# Patient Record
Sex: FEMALE | Race: WHITE
[De-identification: names, ages, dates, MRNs, and addresses within clinical notes are randomized per-mention and may not be internally consistent; named-entity substitution may affect disease eponyms.]

---

## 2017-06-19 ENCOUNTER — HOSPITAL ENCOUNTER (EMERGENCY)
Dept: HOSPITAL 11 - JP.ED | Age: 67
Discharge: HOME | End: 2017-06-19
Payer: MEDICARE

## 2017-06-19 VITALS — DIASTOLIC BLOOD PRESSURE: 78 MMHG | SYSTOLIC BLOOD PRESSURE: 139 MMHG

## 2017-06-19 DIAGNOSIS — J44.9: ICD-10-CM

## 2017-06-19 DIAGNOSIS — N18.3: ICD-10-CM

## 2017-06-19 DIAGNOSIS — Z98.890: ICD-10-CM

## 2017-06-19 DIAGNOSIS — Z98.49: ICD-10-CM

## 2017-06-19 DIAGNOSIS — E78.00: ICD-10-CM

## 2017-06-19 DIAGNOSIS — S73.102A: Primary | ICD-10-CM

## 2017-06-19 DIAGNOSIS — E66.9: ICD-10-CM

## 2017-06-19 DIAGNOSIS — Z79.899: ICD-10-CM

## 2017-06-19 DIAGNOSIS — Z79.82: ICD-10-CM

## 2017-06-19 DIAGNOSIS — Z88.5: ICD-10-CM
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
## 2017-06-19 DIAGNOSIS — Z88.8: ICD-10-CM

## 2017-06-19 DIAGNOSIS — E11.9: ICD-10-CM

## 2017-06-19 DIAGNOSIS — I12.9: ICD-10-CM

## 2017-06-19 DIAGNOSIS — M19.90: ICD-10-CM

## 2017-06-19 PROCEDURE — 73502 X-RAY EXAM HIP UNI 2-3 VIEWS: CPT

## 2017-06-19 PROCEDURE — 99284 EMERGENCY DEPT VISIT MOD MDM: CPT

## 2017-06-19 PROCEDURE — 73700 CT LOWER EXTREMITY W/O DYE: CPT

## 2017-06-19 NOTE — EDM.PDOC
ED HPI GENERAL MEDICAL PROBLEM





- General


Chief Complaint: Lower Extremity Injury/Pain


Stated Complaint: HIP PAIN


Time Seen by Provider: 17 17:50


Source of Information: Reports: Patient


History Limitations: Reports: No Limitations





- History of Present Illness


INITIAL COMMENTS - FREE TEXT/NARRATIVE: 





Patient presents today with complaints of left hip pain status post fall 

forward PTA today.  Sabrina reports she fell forward after slipping on some 

water on the floor.  She denies any other injury or pain today. 


Onset: Today


Duration: Hour(s):


Location: Reports: Other (left hip/groin)


Quality: Reports: Sharp, Stabbing


Severity: Moderate


Improves with: Reports: None


Worsens with: Reports: Movement





- Related Data


 Allergies











Allergy/AdvReac Type Severity Reaction Status Date / Time


 


codeine Allergy Intermediate Rash Verified 17 17:29


 


iodine Allergy Intermediate Rash Verified 17 17:29


 


Iodinated Contrast- Oral and Allergy  Hives Verified 17 17:29





IV Dye     





[Iodinated Contrast Media -     





IV Dye]     


 


isosorbide [Isosorbide] AdvReac  Cough Verified 17 17:29


 


midazolam HCl [From Versed] AdvReac  Confusion Verified 17 17:29


 


NSAIDS (Non-Steroidal AdvReac  Renal Verified 17 17:29





Anti-Inflamma   Failure  











Home Meds: 


 Home Meds





Albuterol Sulfate [Proair Hfa] 2 puff IH QID PRN 10/26/13 [History]


Aspirin [Adult Low Dose Aspirin EC] 81 mg PO DAILY 10/26/13 [History]


Cholecalciferol (Vitamin D3) [Vitamin D3] 1,000 units PO BID 10/26/13 [History]


Gabapentin [Neurontin] 300 mg PO TID 10/26/13 [History]


Loratadine [Allergy] 10 mg PO ACBRK PRN 10/26/13 [History]


Montelukast [Singulair] 10 mg PO BEDTIME 10/26/13 [History]


Polyethylene Glycol 3350 [MiraLAX] 17 gm PO DAILY PRN 10/26/13 [History]


metFORMIN [Glucophage] 1,000 mg PO PCBREAKFAST 10/26/13 [History]


traZODone 50 mg PO BEDTIME 10/26/13 [History]


Citalopram Hydrobromide [Celexa] 40 mg PO BEDTIME 04/08/14 [History]


Verapamil [Calan] 20 mg PO TID 04/10/14 [History]


Ipratropium/Albuterol Sulfate [Combivent Respimat Inhal Spray] 1 puff IH QID 02/

18/15 [History]


Oxybutynin Chloride 5 mg PO TID 02/18/15 [History]


Gabapentin [Neurontin] 600 mg PO BEDTIME 08/05/15 [History]


Hydrocodone/Acetaminophen [Hydrocodon-Acetaminophen 5-325] 1 tab PO Q6H PRN 08/

05/15 [History]


Pantoprazole [Protonix] 40 mg PO BID 10/23/15 [History]


buPROPion HCl [Wellbutrin Xl] 150 mg PO BID 16 [History]


Acetaminophen [Tylenol Arthritis] 650 mg PO Q8H PRN 11/10/16 [History]


atorvaSTATin [Lipitor] 10 mg PO BEDTIME 11/10/16 [History]











Past Medical History


HEENT History: Reports: Cataract, Hard of Hearing, Impaired Vision, Macular 

Degeneration


Other HEENT History: wears glasses


Cardiovascular History: Reports: Arrhythmia, High Cholesterol, Hypertension, 

SOB on Exertion


Respiratory History: Reports: Asthma, COPD, SOB


Gastrointestinal History: Reports: GERD, Hiatal Hernia


Other Gastrointestinal History: Barretts esoph


Genitourinary History: Reports: Renal Disease, Urinary Incontinence


Other Genitourinary History: stage 3 kidney


OB/GYN History: Reports: Pregnancy


Musculoskeletal History: Reports: Arthritis, Fracture, Neck Pain, Chronic, 

Osteoarthritis


Neurological History: Reports: Headaches, Chronic


Other Neuro History: blood clot in brain 1 year ago resolved on it's own


Psychiatric History: Reports: Anxiety, Mood Swings


Endocrine/Metabolic History: Reports: Diabetes, Type II, Obesity/BMI 30+, 

Vitamin D Deficiency


Other Endocrine/Metabolic History: BS this am 135


Hematologic History: Reports: Anemia, Blood Transfusion(s)


Dermatologic History: Reports: Eczema





- Infectious Disease History


Infectious Disease History: Reports: Chicken Pox





- Past Surgical History


Head Surgeries/Procedures: Reports: None


HEENT Surgical History: Reports: Cataract Surgery, Eye Surgery


Respiratory Surgical History: Reports: None


GI Surgical History: Reports: Colonoscopy, EGD, Esophageal Dilatation, Hernia 

Repair/Other, Nissen Fundoplication


Female  Surgical History: Reports:  Section


Musculoskeletal Surgical History: Reports: Shoulder Surgery





Social & Family History





- Family History


Family Medical History: Noncontributory





- Tobacco Use


Smoking Status *Q: Never Smoker


Second Hand Smoke Exposure: Yes





- Caffeine Use


Caffeine Use: Reports: Soda, Tea





- Alcohol Use


Days Per Week of Alcohol Use: 0





- Recreational Drug Use


Recreational Drug Use: No





Review of Systems





- Review of Systems


Review Of Systems: See Below


Constitutional: Reports: No Symptoms


Eyes: Reports: No Symptoms


Ears: Reports: No Symptoms


Nose: Reports: No Symptoms


Respiratory: Reports: No Symptoms


Cardiovascular: Reports: No Symptoms


GI/Abdominal: Reports: No Symptoms


Genitourinary: Reports: No Symptoms


Musculoskeletal: Reports: Joint Pain, Other (left hip pain)


Skin: Denies: Cyanosis, Bruising, Rash, Erythema, Wound


Neurological: Denies: Confusion, Dizziness, Headache, Syncope, Weakness


Psychiatric: Reports: No Symptoms





ED EXAM, GENERAL





- Physical Exam


Exam: See Below


Exam Limited By: No Limitations


General Appearance: Alert, WD/WN, No Apparent Distress


Eye Exam: Bilateral Eye: Normal Inspection, PERRL


Ears: Normal External Exam, Normal Canal, Hearing Grossly Normal, Normal TMs


Ear Exam: Bilateral Ear: Auricle Normal, Canal Normal, TM normal


Nose: Normal Inspection, Normal Mucosa, No Blood


Throat/Mouth: Normal Inspection, Normal Lips, Normal Teeth, Normal Gums, Normal 

Voice, No Airway Compromise


Head: Atraumatic, Normocephalic


Neck: Normal Inspection, Supple, Non-Tender, Full Range of Motion


Respiratory/Chest: No Respiratory Distress, No Accessory Muscle Use, Chest Non-

Tender, Decreased Breath Sounds.  No: Respiratory Distress, Wheezing


Cardiovascular: Normal Peripheral Pulses, Regular Rate, Rhythm, No Edema, No 

Gallop, No Murmur, No Rub


Peripheral Pulses: 2+: Radial (L), Radial (R), Dorsalis Pedis (L), Dorsalis 

Pedis (R)


GI/Abdominal: Normal Bowel Sounds, Soft, Non-Tender, No Organomegaly, No 

Distention, No Abnormal Bruit, No Mass


Back Exam: Normal Inspection, Full Range of Motion.  No: CVA Tenderness (R), 

CVA Tenderness (L)


Extremities: No Pedal Edema, Normal Capillary Refill, Other (left hip pain with 

any movement or rotation. No shortening of left leg. )


Neurological: Alert, Oriented, CN II-XII Intact, Normal Cognition, Normal Gait, 

No Motor/Sensory Deficits


Psychiatric: Normal Affect, Normal Mood


Skin Exam: Warm, Dry, Intact, Normal Color, No Rash


Lymphatic: No Adenopathy





Course





- Vital Signs


Last Recorded V/S: 


 Last Vital Signs











Temp  37.1 C   17 17:38


 


Pulse  78   17 17:38


 


Resp  20   17 17:38


 


BP  139/78   17 17:38


 


Pulse Ox  94 L  17 17:38














- Orders/Labs/Meds


Orders: 


 Active Orders 24 hr











 Category Date Time Status


 


 Hip Min 2V or 3V w Pelvis Lt [CR] Stat Exams  17 18:15 Taken


 


 Hip wo Cont Lt [CT] Stat Exams  17 18:55 Taken











Meds: 


Medications














Discontinued Medications














Generic Name Dose Route Start Last Admin





  Trade Name Deonte  PRN Reason Stop Dose Admin


 


Hydrocodone Bitart/Acetaminophen  0.5 tab  17 18:09  17 18:46





  Norco 325-5 Mg  PO  17 18:10  0.5 tab





  ONETIME ONE   Administration














- Radiology Interpretation


Free Text/Narrative:: 





No acute findings noted to left hip/pelvis x-ray.  Pain continues to be 

disproportionate to left hip.  Attempts to assist patient with bedpan, severe 

pain with movement.  





Patient and her family members in agreement with plan. 


CT Results Date: 17


CT Results Time: 19:48 (No acute pelvic or left hip fracture. )





- Re-Assessments/Exams


Free Text/Narrative Re-Assessment/Exam: 





17 19:48


Patient and family members notified of CT results.  Dr. Pacheco notified of 

results. 


Patient reports pain has lessened after use of hydrocodone. 


We will try to ambulate her with assistance. 


Free Text/Narrative Re-Assessment/Exam: 





17 19:58


Patient up to ambulate with use of front wheel walker, tolerated well with 

slight pain.  Able to ambulate >100ft.





Patient will be discharged to home. 





Departure





- Departure


Time of Disposition: 19:59


Disposition: Home, Self-Care 01


Condition: Fair


Clinical Impression: 


 Sprain of hip, Contusion of hip








- Discharge Information


Instructions:  Hip Pain


Referrals: 


Reece Pichardo MD [Primary Care Provider] - 


Forms:  ED Department Discharge


Additional Instructions: 


You have suffered a sprain/contusion of your left hip.





Keep yourself hydrated, rest, use ice/heat as you tolerated to the left hip 

area.  Use hydrocodone 5/325mg, 1/2 pill every 4 to 6 hours as needed for pain.

  





Follow up with your primary care provider in 7 to 14 days for recheck and pain 

control.





You would benefit from use of a front wheel walker.





I have provided a prescription for you.











- Problem List Review


Problem List Initiated/Reviewed/Updated: Yes





- My Orders


Last 24 Hours: 


My Active Orders





17 18:15


Hip Min 2V or 3V w Pelvis Lt [CR] Stat 





17 18:55


Hip wo Cont Lt [CT] Stat 














- Assessment/Plan


Last 24 Hours: 


My Active Orders





17 18:15


Hip Min 2V or 3V w Pelvis Lt [CR] Stat 





17 18:55


Hip wo Cont Lt [CT] Stat 











Assessment:: 





Left hip sprain, contusion


Unsteady gait


Plan: 


Patient will be discharged to home. 


She is advised to stay hydrated, rest, use ice/heat as tolerated to the left 

hip area.  Use hydrocodone 5/325mg, 1/2 pill every 4 to 6 hours as needed for 

pain.  





Follow up with primary care provider in 7 to 14 days for recheck and pain 

control.





She would benefit from use of a front wheel walker (S73.101A, S70.02XA, R 26.81)

.


Prescription provided.

## 2017-06-20 NOTE — CR
Hip Min 2V or 3V w Pelvis Lt

 

HISTORY: left hip pain status post fall forward

 

COMPARISON: 5/19/2016

 

FINDINGS:

No acute fracture or dislocation about the left hip or pelvis is identified.  Bony architecture and 
joint spaces are preserved. No hypertrophic changes are seen. Soft tissues are unremarkable.

 

IMPRESSION:

No acute left hip or pelvic abnormality is identified.

## 2018-07-02 ENCOUNTER — HOSPITAL ENCOUNTER (OUTPATIENT)
Dept: HOSPITAL 11 - JP.SDS | Age: 68
Discharge: HOME | End: 2018-07-02
Attending: SURGERY
Payer: MEDICARE

## 2018-07-02 VITALS — SYSTOLIC BLOOD PRESSURE: 132 MMHG | DIASTOLIC BLOOD PRESSURE: 81 MMHG

## 2018-07-02 DIAGNOSIS — I12.9: ICD-10-CM

## 2018-07-02 DIAGNOSIS — K29.70: ICD-10-CM

## 2018-07-02 DIAGNOSIS — Z87.19: ICD-10-CM

## 2018-07-02 DIAGNOSIS — K44.9: ICD-10-CM

## 2018-07-02 DIAGNOSIS — Z88.8: ICD-10-CM

## 2018-07-02 DIAGNOSIS — K20.9: ICD-10-CM

## 2018-07-02 DIAGNOSIS — J44.9: ICD-10-CM

## 2018-07-02 DIAGNOSIS — N18.9: ICD-10-CM

## 2018-07-02 DIAGNOSIS — Z09: Primary | ICD-10-CM

## 2018-07-02 PROCEDURE — 43239 EGD BIOPSY SINGLE/MULTIPLE: CPT

## 2018-07-02 PROCEDURE — 88305 TISSUE EXAM BY PATHOLOGIST: CPT

## 2018-07-02 PROCEDURE — 87081 CULTURE SCREEN ONLY: CPT

## 2018-07-11 NOTE — OR
DATE OF PROCEDURE:  07/02/2018

 

PREOPERATIVE DIAGNOSIS:  History of Blanco's esophagus with high-grade dysplasia, status

post radiofrequency ablation treatment.

 

POSTOPERATIVE DIAGNOSES:

1. History of Blanco's esophagus with high-grade dysplasia, status post radiofrequency

    ablation treatment.

2. Antral gastritis.

 

OPERATIVE PROCEDURES:  Upper GI endoscopy with:

1. Biopsies of esophagogastric junction for histologic evaluation.

2. Biopsies of antrum for CLOtest.

 

ANESTHESIA:  IV sedation.

 

INDICATIONS FOR PROCEDURE:  This is a 67-year-old status post a series of radiofrequency

ablation treatments for Blanco's esophagus with high-grade dysplasia.  For followup, she is

to undergo surveillance endoscopies to rule out any recurrent or persistent Blanco's

esophagus.  Potential risks of the procedure including bleeding and perforation were

discussed, and the patient wishes that to proceed.

 

DETAILS OF PROCEDURE:  The patient was taken to the operating room and placed in a left

lateral decubitus position.  IV sedation was administered, after which the upper GI

endoscope was passed orally through the length of the esophagus and into the stomach with

retroflexion view of the fundus, thereafter through the pyloric channel and into junction of

the third and fourth portions of the duodenum.

 

Findings included normal hypopharynx, larynx, upper esophageal junction, and esophageal

body.  At the EG junction, the patient was noted to have a small hiatal hernia.  There was

some upward extension of the gastroesophageal junction mucosal line consistent with her

history of Blanco's esophagus.  There was no significant inflammation and no erosions or

stricturing or plaque formation, i.e. no signs of progressive neoplastic disease.  Within

the stomach, there was some mild patchy redness in the antrum.  Otherwise, the pyloric

channel and visualized portions of the duodenum were unremarkable.  At this point, biopsies

were obtained from the antrum and sent for CLOtest for H. pylori.  Multiple biopsies were

obtained from the area of the esophagogastric junction, focusing on the areas of columnar

mucosa.  Minimal bleeding from the biopsy sites was seen, and the procedure was then

concluded.

 

Assuming that today's biopsies did not show any progression of Blanco's esophagus, we would

recommend a repeat upper endoscopy in one year.  We will continue the present medical

management with Protonix 40 mg a day.

 

 

 

 

Quang Soto MD

DD:  07/10/2018 19:24:06

DT:  07/11/2018 10:35:05

Job #:  [id number]

## 2019-07-15 ENCOUNTER — HOSPITAL ENCOUNTER (OUTPATIENT)
Dept: HOSPITAL 11 - JP.ED | Age: 69
Setting detail: OBSERVATION
LOS: 1 days | Discharge: HOME HEALTH | End: 2019-07-16
Attending: INTERNAL MEDICINE | Admitting: INTERNAL MEDICINE
Payer: MEDICARE

## 2019-07-15 DIAGNOSIS — E11.9: ICD-10-CM

## 2019-07-15 DIAGNOSIS — Z79.84: ICD-10-CM

## 2019-07-15 DIAGNOSIS — Z79.899: ICD-10-CM

## 2019-07-15 DIAGNOSIS — J15.9: Primary | ICD-10-CM

## 2019-07-15 DIAGNOSIS — R29.6: ICD-10-CM

## 2019-07-15 DIAGNOSIS — Z88.8: ICD-10-CM

## 2019-07-15 DIAGNOSIS — J44.9: ICD-10-CM

## 2019-07-15 DIAGNOSIS — Z91.041: ICD-10-CM

## 2019-07-15 DIAGNOSIS — Z88.6: ICD-10-CM

## 2019-07-15 DIAGNOSIS — E78.00: ICD-10-CM

## 2019-07-15 DIAGNOSIS — I10: ICD-10-CM

## 2019-07-15 DIAGNOSIS — Z79.82: ICD-10-CM

## 2019-07-15 DIAGNOSIS — Z88.5: ICD-10-CM

## 2019-07-15 PROCEDURE — 96361 HYDRATE IV INFUSION ADD-ON: CPT

## 2019-07-15 PROCEDURE — 99218: CPT

## 2019-07-15 PROCEDURE — 99217: CPT

## 2019-07-15 PROCEDURE — 96376 TX/PRO/DX INJ SAME DRUG ADON: CPT

## 2019-07-15 PROCEDURE — 99285 EMERGENCY DEPT VISIT HI MDM: CPT

## 2019-07-15 PROCEDURE — 51702 INSERT TEMP BLADDER CATH: CPT

## 2019-07-15 PROCEDURE — 82962 GLUCOSE BLOOD TEST: CPT

## 2019-07-15 PROCEDURE — 85027 COMPLETE CBC AUTOMATED: CPT

## 2019-07-15 PROCEDURE — 80048 BASIC METABOLIC PNL TOTAL CA: CPT

## 2019-07-15 PROCEDURE — 86140 C-REACTIVE PROTEIN: CPT

## 2019-07-15 PROCEDURE — 71046 X-RAY EXAM CHEST 2 VIEWS: CPT

## 2019-07-15 PROCEDURE — 96365 THER/PROPH/DIAG IV INF INIT: CPT

## 2019-07-15 PROCEDURE — 99284 EMERGENCY DEPT VISIT MOD MDM: CPT

## 2019-07-15 PROCEDURE — G0378 HOSPITAL OBSERVATION PER HR: HCPCS

## 2019-07-15 PROCEDURE — 97530 THERAPEUTIC ACTIVITIES: CPT

## 2019-07-15 PROCEDURE — 97110 THERAPEUTIC EXERCISES: CPT

## 2019-07-15 PROCEDURE — 97161 PT EVAL LOW COMPLEX 20 MIN: CPT

## 2019-07-15 PROCEDURE — 36415 COLL VENOUS BLD VENIPUNCTURE: CPT

## 2019-07-15 PROCEDURE — 81001 URINALYSIS AUTO W/SCOPE: CPT

## 2019-07-15 RX ADMIN — Medication SCH MG: at 20:26

## 2019-07-15 RX ADMIN — GABAPENTIN SCH MG: 300 CAPSULE ORAL at 20:22

## 2019-07-15 RX ADMIN — Medication SCH MG: at 20:24

## 2019-07-15 RX ADMIN — PANTOPRAZOLE SODIUM SCH MG: 40 TABLET, DELAYED RELEASE ORAL at 20:26

## 2019-07-15 RX ADMIN — BUPROPION HYDROCHLORIDE SCH MG: 150 TABLET, EXTENDED RELEASE ORAL at 20:25

## 2019-07-15 NOTE — PCM.HP
H&P History of Present Illness





- General


Date of Service: 07/15/19


Admit Problem/Dx: 


 Admission Diagnosis/Problem





Admission Diagnosis/Problem      Pneumonia








Source of Information: Patient, Family, Provider


History Limitations: Reports: No Limitations





- History of Present Illness


Initial Comments - Free Text/Narative: 





CC: I have never fallen like that before 





HPI: Sabrina presented to the ER with weakness after two falls at home this 

morning. The first fall happened this morning will she was trying to get out of 

bed. She reports that she slowly slumped down to the floor after she was not 

able to reach her walker. After a period of time on the floor she was able to 

get to a standing position after using the walker to steady herself as she 

stood up. She tried to get from her bedroom to her recliner but had a second 

fall on the way. Both times she struck her head with the first fall resulting 

in a very mild bump on the left side of her head and the second fall resulting 

in a home mild to moderate bump on the side of her head. She does not report a 

headache. She did not notice any preceding dizziness or lightheadedness. She 

does feel little more short of breath than usual and has been coughing but has 

not produced any sputum. She did have a subjective fever at home this morning. 

Her appetite has been good. Energy had been good up until today. She does have 

a history of falling but has not fallen in some time. No complaints of dysuria 

or frequency of urination. No abdominal pain or nausea. No sick contacts, 

recent travel or new medications.





Workup in the emergency room was suggestive of a right middle lobe pneumonia. 

She has a leukocytosis and C-reactive protein is moderately elevated at 2.3. 

Creatinine is down at 1.3. She is borderline hypoxic at this time. With her 

weakness she would benefit from observation admission and physical therapy as 

well as initiation of antibiotics.





- Related Data


Allergies/Adverse Reactions: 


 Allergies











Allergy/AdvReac Type Severity Reaction Status Date / Time


 


codeine Allergy Intermediate Rash Verified 12/10/18 19:25


 


iodine Allergy Intermediate Rash Verified 12/10/18 19:25


 


Iodinated Contrast- Oral and Allergy  Hives Verified 12/10/18 19:25





IV Dye     





[Iodinated Contrast Media -     





IV Dye]     


 


isosorbide [Isosorbide] AdvReac  Cough Verified 12/10/18 19:25


 


midazolam HCl [From Versed] AdvReac  Confusion Verified 12/10/18 19:25


 


NSAIDS (Non-Steroidal AdvReac  Renal Verified 12/10/18 19:25





Anti-Inflamma   Failure  











Home Medications: 


 Home Meds





Albuterol Sulfate [Proair Hfa] 2 puff IH QID PRN 10/26/13 [History]


Aspirin [Adult Low Dose Aspirin EC] 81 mg PO DAILY 10/26/13 [History]


Cholecalciferol (Vitamin D3) [Vitamin D3] 1,000 units PO BID 10/26/13 [History]


Gabapentin [Neurontin] 300 mg PO TID 10/26/13 [History]


Loratadine [Allergy] 10 mg PO ACBRK PRN 10/26/13 [History]


Montelukast [Singulair] 10 mg PO BEDTIME 10/26/13 [History]


Polyethylene Glycol 3350 [MiraLAX] 17 gm PO DAILY PRN 10/26/13 [History]


metFORMIN [Glucophage] 1,000 mg PO PCBREAKFAST 10/26/13 [History]


traZODone 50 mg PO BEDTIME 10/26/13 [History]


Verapamil [Calan] 20 mg PO TID 04/10/14 [History]


Ipratropium/Albuterol Sulfate [Combivent Respimat  Mcg] 1 puff IH DAILY 02

/18/15 [History]


Oxybutynin Chloride 5 mg PO TID 02/18/15 [History]


Gabapentin [Neurontin] 600 mg PO BEDTIME 08/05/15 [History]


Pantoprazole [Protonix] 40 mg PO BID 10/23/15 [History]


Acetaminophen [Tylenol Arthritis] 650 mg PO Q8H PRN 11/10/16 [History]


Triamcinolone Acetonide [Triamcinolone Acetonide 0.1% Crm] 1 applic TOP TID  [History]


Acetaminophen/HYDROcodone [Norco 325-5 MG] 0.5 tab PO Q4H PRN 07/15/19 [History]


Citalopram Hydrobromide [Celexa] 40 mg PO DAILY 07/15/19 [History]


atorvaSTATin [Lipitor] 10 mg PO BEDTIME 07/15/19 [History]


buPROPion HCl [Wellbutrin SR] 150 mg PO BID 07/15/19 [History]











Past Medical History


HEENT History: Reports: Cataract, Hard of Hearing, Impaired Vision, Macular 

Degeneration


Other HEENT History: wears glasses


Cardiovascular History: Reports: Arrhythmia, High Cholesterol, Hypertension, 

SOB on Exertion


Respiratory History: Reports: Asthma, COPD, SOB


Gastrointestinal History: Reports: GERD, Hiatal Hernia


Other Gastrointestinal History: Barretts esoph


Genitourinary History: Reports: Renal Disease, Urinary Incontinence


Other Genitourinary History: stage 3 kidney


OB/GYN History: Reports: Pregnancy


Musculoskeletal History: Reports: Arthritis, Fracture, Neck Pain, Chronic, 

Osteoarthritis


Neurological History: Reports: Headaches, Chronic


Other Neuro History: blood clot in brain (2016) resolved on it's own


Psychiatric History: Reports: Anxiety, Mood Swings


Endocrine/Metabolic History: Reports: Diabetes, Type II, Obesity/BMI 30+, 

Vitamin D Deficiency


Other Endocrine/Metabolic History: BS this am 100


Hematologic History: Reports: Anemia, Blood Transfusion(s)


Dermatologic History: Reports: Eczema





- Infectious Disease History


Infectious Disease History: Reports: Chicken Pox





- Past Surgical History


Head Surgeries/Procedures: Reports: None


HEENT Surgical History: Reports: Cataract Surgery, Eye Surgery


Cardiovascular Surgical History: Reports: None


Respiratory Surgical History: Reports: None


GI Surgical History: Reports: Colonoscopy, EGD, Esophageal Dilatation, Hernia 

Repair/Other, Nissen Fundoplication


Female  Surgical History: Reports:  Section


Endocrine Surgical History: Reports: None


Neurological Surgical History: Reports: None


Musculoskeletal Surgical History: Reports: Shoulder Surgery


Dermatological Surgical History: Reports: None





Social & Family History





- Family History


Family Medical History: Noncontributory





- Tobacco Use


Smoking Status *Q: Never Smoker





- Caffeine Use


Caffeine Use: Reports: None





- Alcohol Use


Alcohol Use History: No





- Recreational Drug Use


Recreational Drug Use: No





H&P Review of Systems





- Review of Systems:


Review Of Systems: See Below


Free Text/Narrative: 





A complete 12 point review of systems was obtained.  Pertinent positives and 

negatives are noted in the history of present illness.  All other systems were 

reviewed and were negative except as noted.





Exam





- Exam


Exam: See Below





- Vital Signs


Vital Signs: 


 Last Vital Signs











Temp  38.0 C   07/15/19 10:02


 


Pulse  68   07/15/19 12:49


 


Resp  20   07/15/19 09:25


 


BP  116/51 L  07/15/19 12:49


 


Pulse Ox  92 L  07/15/19 12:49











Weight: 67.585 kg





- Exam


Quality Assessment: No: Supplemental Oxygen


General: Alert, Oriented, Cooperative.  No: Mild Distress


HEENT: Conjunctiva Clear, Mucosa Moist & Pink.  No: Scleral Icterus


Neck: Supple, Trachea Midline.  No: Lymphadenopathy


Lungs: Normal Respiratory Effort, Crackles (right mid lung posteriorly).  No: 

Wheezing


Cardiovascular: Regular Rate, Regular Rhythm


GI/Abdominal Exam: Normal Bowel Sounds, Soft, No Distention


Extremities: No Pedal Edema.  No: Increased Warmth


Peripheral Pulses: 2+: Dorsalis Pedis (L), Dorsalis Pedis (R)


Skin: Warm, Dry, Ecchymosis (right medial lower leg )


Neuro Extensive - Mental Status: Alert, Oriented x3, Nl Response to Commands


Neuro Extensive - Motor, Sensory, Reflexes: No: Dysarthria, Abnormal Motor, 

Tremor


Psychiatric: Alert, Normal Affect





- Patient Data


Lab Results Last 24 hrs: 


 Laboratory Results - last 24 hr











  07/15/19 07/15/19 07/15/19 Range/Units





  09:55 09:55 10:28 


 


WBC  17.4 H    (4.5-11.0)  K/uL


 


RBC  4.29    (3.30-5.50)  M/uL


 


Hgb  11.8 L    (12.0-15.0)  g/dL


 


Hct  37.6    (36.0-48.0)  %


 


MCV  88    (80-98)  fL


 


MCH  28    (27-31)  pg


 


MCHC  31 L    (32-36)  %


 


Plt Count  390    (150-400)  K/uL


 


Sodium   132 L   (140-148)  mmol/L


 


Potassium   5.2   (3.6-5.2)  mmol/L


 


Chloride   96 L   (100-108)  mmol/L


 


Carbon Dioxide   26   (21-32)  mmol/L


 


Anion Gap   15.2 H   (5.0-14.0)  mmol/L


 


BUN   16   (7-18)  mg/dL


 


Creatinine   1.3 H   (0.6-1.0)  mg/dL


 


Est Cr Clr Drug Dosing   29.75   mL/min


 


Estimated GFR (MDRD)   41 L   (>60)  


 


Glucose   174 H   ()  mg/dL


 


Calcium   8.7   (8.5-10.1)  mg/dL


 


C-Reactive Protein   1.97 H   (0.0-0.3)  mg/dL


 


Urine Color    Yellow  


 


Urine Appearance    Slightly cloudy  


 


Urine pH    6.0  (4.5-8.0)  


 


Ur Specific Gravity    1.015  (1.008-1.030)  


 


Urine Protein    Negative  (NEGATIVE)  mg/dL


 


Urine Glucose (UA)    250 H  (NEGATIVE)  mg/dL


 


Urine Ketones    Negative  (NEGATIVE)  mg/dL


 


Urine Occult Blood    Moderate  (NEGATIVE)  


 


Urine Nitrite    Negative  (NEGATIVE)  


 


Urine Bilirubin    Negative  (NEGATIVE)  


 


Urine Urobilinogen    Normal  (NORMAL)  mg/dL


 


Ur Leukocyte Esterase    Negative  (NEGATIVE)  


 


Urine RBC    0-5  (0-5)  


 


Urine WBC    0-5  (0-5)  


 


Ur Epithelial Cells    Few  


 


Amorphous Sediment    Not seen  


 


Urine Bacteria    Not seen  


 


Urine Mucus    Not seen  











Result Diagrams: 


 07/15/19 09:55





 07/15/19 09:55


Imaging Impressions Last 24 hrs: 





CXR - images personally reviewed - subtle density right middle lobe. Heart size 

is normal. No mass or effusion. 





*Q Meaningful Use (ADM)





- VTE Risk Assess *Q


Each Risk Factor Represents 1 Point: Obesity ( BMI > 25 kg/m2), Serious lung 

disease including pneumonia


Total Score 1 Point Risk Factors: 2


Each Risk Factor Represents 2 Points: Age 60 - 74 Years


Total Score 2 Point Risk Factors: 2


Each Risk Factor Represents 3 Points: None


Total Score 3 Point Risk Factors: 0


Each Risk Factor Represents 5 Points: None


Total Score 5 Point Risk Factors: 0


Venous Thromboembolism Risk Factor Score *Q: 4





- Problem List


(1) Community acquired bacterial pneumonia


SNOMED Code(s): 276509048, 781813393


   ICD Code: J15.9 - UNSPECIFIED BACTERIAL PNEUMONIA   Status: Acute   Current 

Visit: Yes   





(2) Fall in elderly patient


SNOMED Code(s): 757129322


   ICD Code: R29.6 - REPEATED FALLS   Status: Acute   Current Visit: Yes   


Problem List Initiated/Reviewed/Updated: Yes


Orders Last 24hrs: 


 Active Orders 24 hr











 Category Date Time Status


 


 Patient Status Manage Transfer [TRANSFER] Routine ADT  07/15/19 14:03 Ordered


 


 Sodium Chloride 0.9% [Normal Saline] 1,000 ml Med  07/15/19 12:00 Active





 IV ASDIRECTED   


 


 Resuscitation Status Routine Resus Stat  07/15/19 14:05 Ordered








 Medication Orders





Sodium Chloride (Normal Saline)  1,000 mls @ 100 mls/hr IV ASDIRECTED MEGHAN


   Last Admin: 07/15/19 11:58  Dose: 100 mls/hr








Assessment/Plan Comment:: 





ASSESSMENT AND PLAN - 





Right middle lobe pneumonia - symptoms include cough, shortness of breath and 

weakness. She does have leukocytosis but no evidence for sepsis. Oxygenation is 

borderline at this time. With her weakness and 2 falls at home she would 

benefit from observation. She does have a history of COPD but no evidence for 

exacerbation.


-Ceftriaxone and azithromycin


-Supplement oxygen if needed


-Symptomatic management of cough


-Sputum culture if able





Generalized weakness with 2 falls today - No evidence for trauma on 

examination. No headache or symptoms to suggest injury. Probably related to her 

infection.


-Physical therapy in the morning





Essential hypertension - blood pressure acceptable at this time.


-Continue home medications





Maintenance issues - 


- DVT prophylaxis - mechanical


- GI prophylaxis - continue home PPI


- Nutrition - regular diet


- Espinoza catheter - not indicated





CODE STATUS - full code





Admission justification - patient will be referred observation status for 

antibiotic initiation and physical therapy for strengthening





Disposition - I would anticipate discharged home with home care after the 

hospital stay





Primary care physician - Dr. Marino Rodríguez M.D.

## 2019-07-15 NOTE — CR
Chest 2V: 7/15/2019 10:53 AM

 

INDICATION: Fever, elevated white blood cell count

 

COMPARISON: Chest CT performed on 8/18/2015, chest radiograph performed on

2/18/2015.

 

TECHNIQUE: 2 views of the chest were obtained.

 

FINDINGS: Chronic interstitial changes are present. Ill-defined opacity is

present in the peripheral aspect of the right midlung, suspicious for infection.

No pleural effusions. Cardiomediastinal silhouette is moderately enlarged but

stable in size and contour. No acute osseous abnormality. Changes of DISH and

moderate multilevel degenerative changes throughout the spine.

 

IMPRESSION: 

1.  Ill-defined opacity in the right midlung region, likely representing

pneumonia. Recommend follow-up to resolution to exclude an underlying neoplastic

process.

2.  Cardiomegaly and chronic interstitial changes.

## 2019-07-15 NOTE — EDM.PDOC
ED HPI GENERAL MEDICAL PROBLEM





- General


Chief Complaint: General


Stated Complaint: MEDICAL VIA NORTH


Time Seen by Provider: 07/15/19 09:35


Source of Information: Reports: Patient, EMS, Family, Old Records


History Limitations: Reports: No Limitations





- History of Present Illness


INITIAL COMMENTS - FREE TEXT/NARRATIVE: 





69 yo female presents to the ER via EMS after a fall at home. There were no 

significant injuries. States she was light-headed before falling. Has been 

falling a lot lately and her primary got a walker to help reduce her risk of 

falling. Lives alone. No recent diarrhea, vomiting, or bleeding. Has chronic 

lung dz, but does not think her breathing is any worse than normal. Does have 

more urinary incontinence than usual lately. Was not using her walker when she 

fell today. 


Onset: Gradual


Duration: Week(s):, Getting Worse


Location: Reports: Generalized


Quality: Reports: Other (no pain)


Severity: Moderate


Improves with: Reports: Other (using her walker helps with her falls)


Worsens with: Reports: Other (non-compliance with walker use, medical illness)


Context: Reports: Other (see HPI)


Associated Symptoms: Reports: Shortness of Breath (chronic, stable), Weakness (

generalized).  Denies: Cough, Diaphoresis, Fever/Chills (none reported by 

patient), Nausea/Vomiting, Rash


Treatments PTA: Reports: Other (see below) (none)





- Related Data


 Allergies











Allergy/AdvReac Type Severity Reaction Status Date / Time


 


codeine Allergy Intermediate Rash Verified 12/10/18 19:25


 


iodine Allergy Intermediate Rash Verified 12/10/18 19:25


 


Iodinated Contrast- Oral and Allergy  Hives Verified 12/10/18 19:25





IV Dye     





[Iodinated Contrast Media -     





IV Dye]     


 


isosorbide [Isosorbide] AdvReac  Cough Verified 12/10/18 19:25


 


midazolam HCl [From Versed] AdvReac  Confusion Verified 12/10/18 19:25


 


NSAIDS (Non-Steroidal AdvReac  Renal Verified 12/10/18 19:25





Anti-Inflamma   Failure  











Home Meds: 


 Home Meds





Albuterol Sulfate [Proair Hfa] 2 puff IH QID PRN 10/26/13 [History]


Aspirin [Adult Low Dose Aspirin EC] 81 mg PO DAILY 10/26/13 [History]


Cholecalciferol (Vitamin D3) [Vitamin D3] 1,000 units PO BID 10/26/13 [History]


Gabapentin [Neurontin] 300 mg PO TID 10/26/13 [History]


Loratadine [Allergy] 10 mg PO ACBRK PRN 10/26/13 [History]


Montelukast [Singulair] 10 mg PO BEDTIME 10/26/13 [History]


Polyethylene Glycol 3350 [MiraLAX] 17 gm PO DAILY PRN 10/26/13 [History]


metFORMIN [Glucophage] 1,000 mg PO PCBREAKFAST 10/26/13 [History]


traZODone 50 mg PO BEDTIME 10/26/13 [History]


Verapamil [Calan] 20 mg PO TID 04/10/14 [History]


Ipratropium/Albuterol Sulfate [Combivent Respimat  Mcg] 1 puff IH DAILY 02

/18/15 [History]


Oxybutynin Chloride 5 mg PO TID 02/18/15 [History]


Gabapentin [Neurontin] 600 mg PO BEDTIME 08/05/15 [History]


Pantoprazole [Protonix] 40 mg PO BID 10/23/15 [History]


Acetaminophen [Tylenol Arthritis] 650 mg PO Q8H PRN 11/10/16 [History]


Triamcinolone Acetonide [Triamcinolone Acetonide 0.1% Crm] 1 applic TOP TID  [History]


Acetaminophen/HYDROcodone [Norco 325-5 MG] 0.5 tab PO Q4H PRN 07/15/19 [History]


Citalopram Hydrobromide [Celexa] 40 mg PO DAILY 07/15/19 [History]


atorvaSTATin [Lipitor] 10 mg PO BEDTIME 07/15/19 [History]


buPROPion HCl [Wellbutrin SR] 150 mg PO BID 07/15/19 [History]











Past Medical History


HEENT History: Reports: Cataract, Hard of Hearing, Impaired Vision, Macular 

Degeneration


Other HEENT History: wears glasses


Cardiovascular History: Reports: Arrhythmia, High Cholesterol, Hypertension, 

SOB on Exertion


Respiratory History: Reports: Asthma, COPD, SOB


Gastrointestinal History: Reports: GERD, Hiatal Hernia


Other Gastrointestinal History: Barretts esoph


Genitourinary History: Reports: Renal Disease, Urinary Incontinence


Other Genitourinary History: stage 3 kidney


OB/GYN History: Reports: Pregnancy


Musculoskeletal History: Reports: Arthritis, Fracture, Neck Pain, Chronic, 

Osteoarthritis


Neurological History: Reports: Headaches, Chronic


Other Neuro History: blood clot in brain (2016) resolved on it's own


Psychiatric History: Reports: Anxiety, Mood Swings


Endocrine/Metabolic History: Reports: Diabetes, Type II, Obesity/BMI 30+, 

Vitamin D Deficiency


Other Endocrine/Metabolic History: BS this am 100


Hematologic History: Reports: Anemia, Blood Transfusion(s)


Dermatologic History: Reports: Eczema





- Infectious Disease History


Infectious Disease History: Reports: Chicken Pox





- Past Surgical History


Head Surgeries/Procedures: Reports: None


HEENT Surgical History: Reports: Cataract Surgery, Eye Surgery


Cardiovascular Surgical History: Reports: None


Respiratory Surgical History: Reports: None


GI Surgical History: Reports: Colonoscopy, EGD, Esophageal Dilatation, Hernia 

Repair/Other, Nissen Fundoplication


Female  Surgical History: Reports:  Section


Endocrine Surgical History: Reports: None


Neurological Surgical History: Reports: None


Musculoskeletal Surgical History: Reports: Shoulder Surgery


Dermatological Surgical History: Reports: None





Social & Family History





- Family History


Family Medical History: Noncontributory





- Tobacco Use


Smoking Status *Q: Never Smoker





- Caffeine Use


Caffeine Use: Reports: None





- Recreational Drug Use


Recreational Drug Use: No





ED ROS GENERAL





- Review of Systems


Review Of Systems: See Below


Constitutional: Reports: Weakness


HEENT: Reports: Other (dry mouth)


Respiratory: Reports: Shortness of Breath (chronic, stable)


Cardiovascular: Reports: Lightheadedness (worse today)


Endocrine: Reports: No Symptoms


GI/Abdominal: Reports: No Symptoms


: Reports: Incontinence


Musculoskeletal: Reports: No Symptoms


Skin: Reports: No Symptoms


Neurological: Reports: No Symptoms


Psychiatric: Reports: No Symptoms





ED EXAM, GENERAL





- Physical Exam


Exam: See Below


Exam Limited By: No Limitations


General Appearance: Alert, WD/WN, No Apparent Distress, Other (Looks older than 

her stated age.)


Eye Exam: Bilateral Eye: Normal Inspection


Ears: Normal External Exam, Normal Canal, Hearing Grossly Normal, Normal TMs


Ear Exam: Bilateral Ear: Auricle Normal, Canal Normal, TM normal


Nose: Normal Inspection, No Blood


Throat/Mouth: Normal Lips, Normal Oropharynx, Normal Voice, No Airway Compromise

, Other (dry oral mucosa, edentulous).  No: Normal Teeth


Head: Atraumatic, Normocephalic


Neck: Normal Inspection, Non-Tender


Respiratory/Chest: No Respiratory Distress, Lungs Clear, No Accessory Muscle Use

, Decreased Breath Sounds


Cardiovascular: Regular Rate, Rhythm, No Edema


GI/Abdominal: Normal Bowel Sounds, Soft, Non-Tender, No Distention


Back Exam: Normal Inspection.  No: CVA Tenderness (R), CVA Tenderness (L)


Extremities: Normal Inspection, Normal Range of Motion, Non-Tender, No Pedal 

Edema


Neurological: Alert, Oriented, CN II-XII Intact, Normal Cognition, No Motor/

Sensory Deficits


Psychiatric: Normal Affect, Normal Mood


Skin Exam: Warm, Dry, Intact, Normal Color, No Rash





Course





- Vital Signs


Text/Narrative:: 





Dr. Rodríguez called @ 1232h


Last Recorded V/S: 


 Last Vital Signs











Temp  38.0 C   07/15/19 10:02


 


Pulse  98   07/15/19 09:25


 


Resp  20   07/15/19 09:25


 


BP  137/60   07/15/19 09:25


 


Pulse Ox  89 L  07/15/19 09:25














- Orders/Labs/Meds


Orders: 


 Active Orders 24 hr











 Category Date Time Status


 


 Azithromycin [Zithromax] Med  07/15/19 12:33 Once





 500 mg PO ONETIME ONE   


 


 Sodium Chloride 0.9% [Normal Saline] 1,000 ml Med  07/15/19 12:00 Active





 IV ASDIRECTED   


 


 cefTRIAXone [Rocephin] 1 gm Med  07/15/19 12:33 Ordered





 Sodium Chloride 0.9% [Normal Saline] 50 ml   





 IV ONETIME   








 Medication Orders





Sodium Chloride (Normal Saline)  1,000 mls @ 100 mls/hr IV ASDIRECTED MEGHAN


   Last Admin: 07/15/19 11:58  Dose: 100 mls/hr








Labs: 


 Laboratory Tests











  07/15/19 07/15/19 07/15/19 Range/Units





  09:55 09:55 10:28 


 


WBC  17.4 H    (4.5-11.0)  K/uL


 


RBC  4.29    (3.30-5.50)  M/uL


 


Hgb  11.8 L    (12.0-15.0)  g/dL


 


Hct  37.6    (36.0-48.0)  %


 


MCV  88    (80-98)  fL


 


MCH  28    (27-31)  pg


 


MCHC  31 L    (32-36)  %


 


Plt Count  390    (150-400)  K/uL


 


Sodium   132 L   (140-148)  mmol/L


 


Potassium   5.2   (3.6-5.2)  mmol/L


 


Chloride   96 L   (100-108)  mmol/L


 


Carbon Dioxide   26   (21-32)  mmol/L


 


Anion Gap   15.2 H   (5.0-14.0)  mmol/L


 


BUN   16   (7-18)  mg/dL


 


Creatinine   1.3 H   (0.6-1.0)  mg/dL


 


Est Cr Clr Drug Dosing   29.75   mL/min


 


Estimated GFR (MDRD)   41 L   (>60)  


 


Glucose   174 H   ()  mg/dL


 


Calcium   8.7   (8.5-10.1)  mg/dL


 


C-Reactive Protein   1.97 H   (0.0-0.3)  mg/dL


 


Urine Color    Yellow  


 


Urine Appearance    Slightly cloudy  


 


Urine pH    6.0  (4.5-8.0)  


 


Ur Specific Gravity    1.015  (1.008-1.030)  


 


Urine Protein    Negative  (NEGATIVE)  mg/dL


 


Urine Glucose (UA)    250 H  (NEGATIVE)  mg/dL


 


Urine Ketones    Negative  (NEGATIVE)  mg/dL


 


Urine Occult Blood    Moderate  (NEGATIVE)  


 


Urine Nitrite    Negative  (NEGATIVE)  


 


Urine Bilirubin    Negative  (NEGATIVE)  


 


Urine Urobilinogen    Normal  (NORMAL)  mg/dL


 


Ur Leukocyte Esterase    Negative  (NEGATIVE)  


 


Urine RBC    0-5  (0-5)  


 


Urine WBC    0-5  (0-5)  


 


Ur Epithelial Cells    Few  


 


Amorphous Sediment    Not seen  


 


Urine Bacteria    Not seen  


 


Urine Mucus    Not seen  











Meds: 


Medications











Generic Name Dose Route Start Last Admin





  Trade Name Freq  PRN Reason Stop Dose Admin


 


Sodium Chloride  1,000 mls @ 100 mls/hr  07/15/19 12:00  07/15/19 11:58





  Normal Saline  IV   100 mls/hr





  ASDIRECTED MEGHAN   Administration





     





     





     





     














Discontinued Medications














Generic Name Dose Route Start Last Admin





  Trade Name Freq  PRN Reason Stop Dose Admin


 


Acetaminophen  650 mg  07/15/19 09:45  07/15/19 10:02





  Tylenol  PO  07/15/19 09:46  650 mg





  NOW ONE   Administration





     





     





     





     


 


Sodium Chloride  1,000 mls @ 1,000 mls/hr  07/15/19 10:17  07/15/19 10:33





  Normal Saline  IV  07/15/19 11:16  1,000 mls/hr





  .BOLUS ONE   Administration





     





     





     





     














- Radiology Interpretation


Free Text/Narrative:: 





CXR-? RML infiltrate





Departure





- Departure


Time of Disposition: 12:50


Disposition: Refer to Observation


Condition: Fair


Clinical Impression: 


 Community acquired bacterial pneumonia, Hyponatremia, Fall in elderly patient, 

Dizziness








- Discharge Information


*PRESCRIPTION DRUG MONITORING PROGRAM REVIEWED*: No


*COPY OF PRESCRIPTION DRUG MONITORING REPORT IN PATIENT PILLO: No


Referrals: 


Reece Pichardo MD [Primary Care Provider] - 


Forms:  ED Department Discharge





- My Orders


Last 24 Hours: 


My Active Orders





07/15/19 12:00


Sodium Chloride 0.9% [Normal Saline] 1,000 ml IV ASDIRECTED 





07/15/19 12:33


Azithromycin [Zithromax]   500 mg PO ONETIME ONE 


cefTRIAXone [Rocephin] 1 gm   Sodium Chloride 0.9% [Normal Saline] 50 ml IV 

ONETIME 














- Assessment/Plan


Last 24 Hours: 


My Active Orders





07/15/19 12:00


Sodium Chloride 0.9% [Normal Saline] 1,000 ml IV ASDIRECTED 





07/15/19 12:33


Azithromycin [Zithromax]   500 mg PO ONETIME ONE 


cefTRIAXone [Rocephin] 1 gm   Sodium Chloride 0.9% [Normal Saline] 50 ml IV 

ONETIME

## 2019-07-16 VITALS — DIASTOLIC BLOOD PRESSURE: 46 MMHG | HEART RATE: 72 BPM | SYSTOLIC BLOOD PRESSURE: 133 MMHG

## 2019-07-16 RX ADMIN — PANTOPRAZOLE SODIUM SCH MG: 40 TABLET, DELAYED RELEASE ORAL at 08:08

## 2019-07-16 RX ADMIN — Medication SCH MG: at 08:08

## 2019-07-16 RX ADMIN — Medication SCH MG: at 08:07

## 2019-07-16 RX ADMIN — Medication SCH MG: at 13:21

## 2019-07-16 RX ADMIN — GABAPENTIN SCH MG: 300 CAPSULE ORAL at 08:05

## 2019-07-16 RX ADMIN — Medication SCH MG: at 13:20

## 2019-07-16 RX ADMIN — BUPROPION HYDROCHLORIDE SCH MG: 150 TABLET, EXTENDED RELEASE ORAL at 08:09

## 2019-07-16 RX ADMIN — GABAPENTIN SCH MG: 300 CAPSULE ORAL at 13:19

## 2019-07-16 NOTE — PCM.DCSUM1
**Discharge Summary





- Hospital Course


Brief History: 68-year-old female with history of COPD, well-controlled 

diabetes mellitus and chronic urge incontinence who presented with weakness and 

cough. She was admitted for management of right lower lobe pneumonia and 

weakness.


Diagnosis: Stroke: No





- Discharge Data


Discharge Date: 07/16/19


Discharge Disposition: Home, [facility]


Condition: Good





- Discharge Diagnosis/Problem(s)


(1) Community acquired bacterial pneumonia


SNOMED Code(s): 276834604, 221340406


   ICD Code: J15.9 - UNSPECIFIED BACTERIAL PNEUMONIA   Status: Acute   Current 

Visit: Yes   





(2) Fall in elderly patient


SNOMED Code(s): 053614750


   ICD Code: R29.6 - REPEATED FALLS   Status: Acute   Current Visit: Yes   





- Patient Summary/Data


Consults: 


 Consultations





07/16/19 07:00


PT Evaluation and Treatment [CONS] Routine 


   Please Evaluate and Treat.


   PT Reason for Consult: Strengthening


   This query below is only for informational purposes and is not editable.











Hospital Course: 





Sabrina presented to the emergency room with weakness, cough and mild shortness 

of breath. She had 2 falls at home the morning prior to presentation. Workup in 

the emergency room revealed leukocytosis and right middle lobe pneumonia. 

Fortunately she was not hypoxic and did not have evidence for sepsis. Because 

of her weakness and falls she was admitted for observation and initiation of 

antibiotics. Overnight following admission there were no acute issues. She did 

not have any fevers. She did not require supplemental oxygen. Cough is now 

productive. She is not significantly short of breath. She has been up and 

moving around with her walker and feels well. She did well with physical 

therapy this morning. Appetite has been good. She feels well enough to go home 

at this time. I believe she is stable and safe for outpatient management at 

this point as well. She will need 3 additional days of antibiotic therapy to 

complete a total of 5 days. She will continue her previous home care orders.





- Patient Instructions


Diet: Diabetic Diet


Activity: As Tolerated


Showering/Bathing: May Shower


Notify Provider of: Fever, Increased Pain, Nausea and/or Vomiting


Other/Special Instructions: 1. You were in the hospital for management of right 

middle lobe pneumonia. Your condition has been improving with antibiotic 

therapy. I recommend additional antibiotic therapy with cefdinir (Omnicef) and 

azithromycin. You should take cefdinir 300 mg twice daily with food for 3 more 

days. Your next dose is due tomorrow. You should take azithromycin 500 mg once 

daily in the mor for 3 more days. Your next dose is due tomorrow morning. Your 

next dose is due tomorrow morning.  2. Follow up  With Dr. Pichardo next week - 

follow-up hospital stay for pneumonia and discuss treatment of urinary retention

/incontinence.  3. Continue your usual home medications as previously 

prescribed.  4. Continue your previous home care orders after hospital 

discharge.  5. Seek medical attention if you have fever greater than 101, 

severe shortness of breath or if you develop chest pain/pressure





- Discharge Plan


*PRESCRIPTION DRUG MONITORING PROGRAM REVIEWED*: No


*COPY OF PRESCRIPTION DRUG MONITORING REPORT IN PATIENT PILLO: No


Prescriptions/Med Rec: 


Azithromycin 500 mg PO DAILY #3 tablet


Cefdinir 300 mg PO BID #6 capsule


Home Medications: 


 Home Meds





Albuterol Sulfate [Proair Hfa] 2 puff IH QID PRN 10/26/13 [History]


Aspirin [Adult Low Dose Aspirin EC] 81 mg PO DAILY 10/26/13 [History]


Cholecalciferol (Vitamin D3) [Vitamin D3] 1,000 units PO BID 10/26/13 [History]


Gabapentin [Neurontin] 300 mg PO TID 10/26/13 [History]


Loratadine [Allergy] 10 mg PO ACBRK PRN 10/26/13 [History]


Montelukast [Singulair] 10 mg PO BEDTIME 10/26/13 [History]


Polyethylene Glycol 3350 [MiraLAX] 17 gm PO DAILY PRN 10/26/13 [History]


metFORMIN [Glucophage] 1,000 mg PO PCBREAKFAST 10/26/13 [History]


traZODone 50 mg PO BEDTIME 10/26/13 [History]


Verapamil [Calan] 20 mg PO TID 04/10/14 [History]


Ipratropium/Albuterol Sulfate [Combivent Respimat  Mcg] 1 puff IH DAILY 02

/18/15 [History]


Oxybutynin Chloride 5 mg PO TID 02/18/15 [History]


Gabapentin [Neurontin] 600 mg PO BEDTIME 08/05/15 [History]


Pantoprazole [ProTONIX***] 40 mg PO BID 10/23/15 [History]


Acetaminophen [Tylenol Arthritis] 650 mg PO Q8H PRN 11/10/16 [History]


Triamcinolone Acetonide [Triamcinolone Acetonide 0.1% Crm] 1 applic TOP TID 06/ 28/18 [History]


Acetaminophen/HYDROcodone [Norco 325-5 MG] 0.5 tab PO Q4H PRN 07/15/19 [History]


Citalopram Hydrobromide [Celexa] 40 mg PO DAILY 07/15/19 [History]


atorvaSTATin [Lipitor] 10 mg PO BEDTIME 07/15/19 [History]


buPROPion HCl [Wellbutrin SR] 150 mg PO BID 07/15/19 [History]


Azithromycin 500 mg PO DAILY #3 tablet 07/16/19 [Rx]


Cefdinir 300 mg PO BID #6 capsule 07/16/19 [Rx]








Oxygen Therapy Mode: Room Air


Patient Handouts:  Cefdinir capsules, Community-Acquired Pneumonia, Adult, Easy-

to-Read


Referrals: 


Reece Pichardo MD [Primary Care Provider] - 07/24/19 1:30 pm (Please arrive 15 

minutes early to register for your appointment.)





- Discharge Summary/Plan Comment


DC Time >30 min.: No





- Patient Data


Vitals - Most Recent: 


 Last Vital Signs











Temp  36.9 C   07/16/19 11:12


 


Pulse  74   07/16/19 11:12


 


Resp  16   07/16/19 11:12


 


BP  149/64 H  07/16/19 11:12


 


Pulse Ox  93 L  07/16/19 11:12











Weight - Most Recent: 70.76 kg


I&O - Last 24 hours: 


 Intake & Output











 07/15/19 07/16/19 07/16/19





 22:59 06:59 14:59


 


Intake Total 100 1575 120


 


Output Total 550 2250 


 


Balance -450 -675 120











Lab Results - Last 24 hrs: 


 Laboratory Results - last 24 hr











  07/16/19 07/16/19 Range/Units





  05:38 05:38 


 


WBC  10.1   (4.5-11.0)  K/uL


 


RBC  3.68   (3.30-5.50)  M/uL


 


Hgb  10.2 L   (12.0-15.0)  g/dL


 


Hct  32.6 L   (36.0-48.0)  %


 


MCV  89   (80-98)  fL


 


MCH  28   (27-31)  pg


 


MCHC  31 L   (32-36)  %


 


Plt Count  361   (150-400)  K/uL


 


Sodium   141  (140-148)  mmol/L


 


Potassium   4.5  (3.6-5.2)  mmol/L


 


Chloride   108  (100-108)  mmol/L


 


Carbon Dioxide   27  (21-32)  mmol/L


 


Anion Gap   6.3  (5.0-14.0)  mmol/L


 


BUN   15  (7-18)  mg/dL


 


Creatinine   1.0  (0.6-1.0)  mg/dL


 


Est Cr Clr Drug Dosing   38.68  mL/min


 


Estimated GFR (MDRD)   55 L  (>60)  


 


Glucose   88  ()  mg/dL


 


Calcium   8.2 L  (8.5-10.1)  mg/dL











Med Orders - Current: 


 Current Medications





Acetaminophen (Tylenol)  650 mg PO Q4H PRN


   PRN Reason: Pain (Mild 1-3)/fever


Hydrocodone Bitart/Acetaminophen (Norco 325-5 Mg)  0.5 tab PO Q4H PRN


   PRN Reason: Pain


Albuterol (Proventil Neb Soln)  2.5 mg NEB Q4H PRN


   PRN Reason: Shortness Of Breath/wheezing


Aspirin (Halfprin)  81 mg PO DAILY [facility]


   Last Admin: 07/16/19 08:04 Dose:  81 mg


Atorvastatin Calcium (Lipitor)  10 mg PO BEDTIME [facility]


   Last Admin: 07/15/19 20:22 Dose:  10 mg


Azithromycin (Zithromax)  500 mg PO DAILY [facility]


   Last Admin: 07/16/19 08:10 Dose:  500 mg


Bupropion HCl (Wellbutrin Sr)  150 mg PO BID [facility]


   Last Admin: 07/16/19 08:09 Dose:  150 mg


Gabapentin (Neurontin)  300 mg PO TID [facility]


   Last Admin: 07/16/19 08:05 Dose:  300 mg


Guaifenesin/Dextromethorphan (Robitussin Dm)  10 ml PO Q4H PRN


   PRN Reason: Cough


Sodium Chloride (Normal Saline)  1,000 mls @ 100 mls/hr IV ASDIRECTED [facility]


   Last Admin: 07/16/19 08:19 Dose:  100 mls/hr


Ceftriaxone Sodium 1 gm/ (Sodium Chloride)  50 mls @ 100 mls/hr IV Q24H [facility]


   Last Admin: 07/16/19 11:18 Dose:  100 mls/hr


Loratadine (Claritin)  10 mg PO ACBRK PRN


   PRN Reason: Allergies


Magnesium Hydroxide (Milk Of Magnesia)  30 ml PO Q12H PRN


   PRN Reason: Constipation


Montelukast Sodium (Singulair)  10 mg PO BEDTIME [facility]


   Last Admin: 07/15/19 20:25 Dose:  10 mg


Citalopram 40 Mg ** (Ptom**)  0 mg PO DAILY [facility]


   Last Admin: 07/16/19 08:04 Dose:  40 mg


Metformin 1,000 Mg * (*Ptom**)  0 mg PO PCBREAKFAST [facility]


   Last Admin: 07/16/19 08:06 Dose:  1,000 mg


Oxybutynin 5 Mg ** (Ptom**)  5 mg PO TID [facility]


   Last Admin: 07/16/19 08:07 Dose:  5 mg


Verapamil 40 Mg ** (Ptom**)  0 mg PO TID [facility]


   Last Admin: 07/16/19 08:08 Dose:  20 mg


Ondansetron HCl (Zofran Odt)  4 mg PO Q6H PRN


   PRN Reason: Nausea able to take PO


Pantoprazole Sodium (Protonix***)  40 mg PO BID [facility]


   Last Admin: 07/16/19 08:08 Dose:  40 mg


Senna/Docusate Sodium (Senna Plus)  1 tab PO BID PRN


   PRN Reason: Constipation


Trazodone HCl (Trazodone)  50 mg PO BEDTIME [facility]


   Last Admin: 07/15/19 20:25 Dose:  50 mg





Discontinued Medications





Acetaminophen (Tylenol)  650 mg PO NOW ONE


   Stop: 07/15/19 09:46


   Last Admin: 07/15/19 10:02 Dose:  650 mg


Azithromycin (Zithromax)  500 mg PO ONETIME ONE


   Stop: 07/15/19 12:34


   Last Admin: 07/15/19 12:52 Dose:  500 mg


Sodium Chloride (Normal Saline)  1,000 mls @ 1,000 mls/hr IV .BOLUS ONE


   Stop: 07/15/19 11:16


   Last Admin: 07/15/19 10:33 Dose:  1,000 mls/hr


Ceftriaxone Sodium 1 gm/ (Sodium Chloride)  50 mls @ 100 mls/hr IV ONETIME ONE


   Stop: 07/15/19 13:02


   Last Admin: 07/15/19 12:52 Dose:  100 mls/hr


Non-Formulary Medication (Ipratropium/Albuterol Sulfate [Combivent Respimat 20-

100 Mcg])  1 puff IH DAILY [facility]











- Exam


Quality Assessment: Denies: Supplemental Oxygen


General: Reports: Alert, Oriented, Cooperative, No Acute Distress


Lungs: Reports: Clear to Auscultation, Normal Respiratory Effort.  Denies: 

Wheezing


Cardiovascular: Reports: Regular Rate, Regular Rhythm


GI/Abdominal Exam: Soft, No Distention


Extremities: No Pedal Edema


Psy/Mental Status: Reports: Alert, Normal Affect

## 2019-12-09 ENCOUNTER — HOSPITAL ENCOUNTER (INPATIENT)
Dept: HOSPITAL 11 - JP.ED | Age: 69
LOS: 3 days | Discharge: HOME HEALTH | DRG: 179 | End: 2019-12-12
Attending: INTERNAL MEDICINE | Admitting: INTERNAL MEDICINE
Payer: MEDICARE

## 2019-12-09 DIAGNOSIS — N18.3: ICD-10-CM

## 2019-12-09 DIAGNOSIS — Z98.49: ICD-10-CM

## 2019-12-09 DIAGNOSIS — Z79.84: ICD-10-CM

## 2019-12-09 DIAGNOSIS — H54.7: ICD-10-CM

## 2019-12-09 DIAGNOSIS — H91.90: ICD-10-CM

## 2019-12-09 DIAGNOSIS — E66.9: ICD-10-CM

## 2019-12-09 DIAGNOSIS — R29.6: ICD-10-CM

## 2019-12-09 DIAGNOSIS — E78.00: ICD-10-CM

## 2019-12-09 DIAGNOSIS — Z79.899: ICD-10-CM

## 2019-12-09 DIAGNOSIS — J44.9: ICD-10-CM

## 2019-12-09 DIAGNOSIS — F41.9: ICD-10-CM

## 2019-12-09 DIAGNOSIS — K44.9: ICD-10-CM

## 2019-12-09 DIAGNOSIS — H35.30: ICD-10-CM

## 2019-12-09 DIAGNOSIS — Z88.5: ICD-10-CM

## 2019-12-09 DIAGNOSIS — Z91.041: ICD-10-CM

## 2019-12-09 DIAGNOSIS — E55.9: ICD-10-CM

## 2019-12-09 DIAGNOSIS — M54.2: ICD-10-CM

## 2019-12-09 DIAGNOSIS — D64.9: ICD-10-CM

## 2019-12-09 DIAGNOSIS — K21.9: ICD-10-CM

## 2019-12-09 DIAGNOSIS — J69.0: ICD-10-CM

## 2019-12-09 DIAGNOSIS — M19.90: ICD-10-CM

## 2019-12-09 DIAGNOSIS — Z79.82: ICD-10-CM

## 2019-12-09 DIAGNOSIS — G89.29: ICD-10-CM

## 2019-12-09 DIAGNOSIS — I12.9: ICD-10-CM

## 2019-12-09 DIAGNOSIS — Z88.6: ICD-10-CM

## 2019-12-09 DIAGNOSIS — E11.22: ICD-10-CM

## 2019-12-09 DIAGNOSIS — J18.1: Primary | ICD-10-CM

## 2019-12-09 DIAGNOSIS — K22.70: ICD-10-CM

## 2019-12-09 DIAGNOSIS — Z88.8: ICD-10-CM

## 2019-12-09 DIAGNOSIS — R32: ICD-10-CM

## 2019-12-09 DIAGNOSIS — Z98.890: ICD-10-CM

## 2019-12-09 PROCEDURE — 71045 X-RAY EXAM CHEST 1 VIEW: CPT

## 2019-12-09 PROCEDURE — 96365 THER/PROPH/DIAG IV INF INIT: CPT

## 2019-12-09 PROCEDURE — 83605 ASSAY OF LACTIC ACID: CPT

## 2019-12-09 PROCEDURE — 87040 BLOOD CULTURE FOR BACTERIA: CPT

## 2019-12-09 PROCEDURE — 80053 COMPREHEN METABOLIC PANEL: CPT

## 2019-12-09 PROCEDURE — 81001 URINALYSIS AUTO W/SCOPE: CPT

## 2019-12-09 PROCEDURE — 99285 EMERGENCY DEPT VISIT HI MDM: CPT

## 2019-12-09 PROCEDURE — 36415 COLL VENOUS BLD VENIPUNCTURE: CPT

## 2019-12-09 PROCEDURE — 85025 COMPLETE CBC W/AUTO DIFF WBC: CPT

## 2019-12-09 RX ADMIN — SODIUM CHLORIDE SCH MLS/HR: 9 INJECTION, SOLUTION INTRAVENOUS at 21:10

## 2019-12-09 RX ADMIN — TRIAMCINOLONE ACETONIDE SCH: 1 CREAM TOPICAL at 11:39

## 2019-12-09 RX ADMIN — BUPROPION HYDROCHLORIDE SCH MG: 150 TABLET, EXTENDED RELEASE ORAL at 20:01

## 2019-12-09 RX ADMIN — INSULIN LISPRO SCH: 100 INJECTION, SOLUTION INTRAVENOUS; SUBCUTANEOUS at 19:52

## 2019-12-09 RX ADMIN — TRIAMCINOLONE ACETONIDE SCH: 1 CREAM TOPICAL at 13:46

## 2019-12-09 RX ADMIN — INSULIN LISPRO SCH: 100 INJECTION, SOLUTION INTRAVENOUS; SUBCUTANEOUS at 11:45

## 2019-12-09 RX ADMIN — BUPROPION HYDROCHLORIDE SCH MG: 150 TABLET, EXTENDED RELEASE ORAL at 11:37

## 2019-12-09 RX ADMIN — SODIUM CHLORIDE SCH MLS/HR: 9 INJECTION, SOLUTION INTRAVENOUS at 15:12

## 2019-12-09 RX ADMIN — TRIAMCINOLONE ACETONIDE SCH DOSE: 1 CREAM TOPICAL at 20:01

## 2019-12-09 RX ADMIN — Medication SCH CAP: at 20:00

## 2019-12-09 RX ADMIN — SODIUM CHLORIDE SCH MLS/HR: 9 INJECTION, SOLUTION INTRAVENOUS at 11:34

## 2019-12-09 RX ADMIN — INSULIN LISPRO SCH: 100 INJECTION, SOLUTION INTRAVENOUS; SUBCUTANEOUS at 17:21

## 2019-12-09 RX ADMIN — Medication SCH CAP: at 15:10

## 2019-12-09 NOTE — PCM.HP.2
H&P History of Present Illness





- General


Date of Service: 19


Admit Problem/Dx: 


 Admission Diagnosis/Problem





Admission Diagnosis/Problem      Pneumonia








Source of Information: Patient, Family, Provider, RN Notes Reviewed


History Limitations: Reports: No Limitations





- History of Present Illness


Initial Comments - Free Text/Narative: 





Ms. Sheehan is a 69-year-old woman who was admitted through the emergency 

department with weakness, fever, and cough, secondary to bilateral pneumonia. 

She reports that she felt well yesterday but she did have an episode of 

significant coughing and choking while eating. She reports that this happens on 

an intermittent basis especially when she is very hungry and brushes when 

eating. Her granddaughter is present with her today and reports that she also 

experienced some choking and coughing with eating breakfast here in the 

emergency department. This morning she was extremely weak and unable to stand, 

she called for help and was brought into the emergency department by EMS. White 

blood cell count is elevated and she did have a documented temperature 

elevation of 102.2. Chest x-ray shows evidence of basilar pneumonia.





- Related Data


Allergies/Adverse Reactions: 


 Allergies











Allergy/AdvReac Type Severity Reaction Status Date / Time


 


codeine Allergy Intermediate Rash Verified 19 07:05


 


iodine Allergy Intermediate Rash Verified 19 07:05


 


Iodinated Contrast Media Allergy  Hives Verified 19 07:05





[Iodinated Contrast Media -     





IV Dye]     


 


isosorbide [Isosorbide] AdvReac  Cough Verified 19 07:05


 


midazolam HCl [From Versed] AdvReac  Confusion Verified 19 07:05


 


NSAIDS (Non-Steroidal AdvReac  Renal Verified 19 07:05





Anti-Inflamma   Failure  











Home Medications: 


 Home Meds





Albuterol Sulfate [Proair Hfa] 2 puff IH QID PRN 10/26/13 [History]


Aspirin [Adult Low Dose Aspirin EC] 81 mg PO DAILY 10/26/13 [History]


Cholecalciferol (Vitamin D3) [Vitamin D3] 1,000 units PO BID 10/26/13 [History]


Gabapentin [Neurontin] 300 mg PO TID 10/26/13 [History]


Loratadine [Allergy] 10 mg PO ACBRK PRN 10/26/13 [History]


Montelukast [Singulair] 10 mg PO BEDTIME 10/26/13 [History]


Polyethylene Glycol 3350 [MiraLAX] 17 gm PO DAILY PRN 10/26/13 [History]


metFORMIN [Glucophage] 1,000 mg PO PCBREAKFAST 10/26/13 [History]


traZODone 50 mg PO BEDTIME 10/26/13 [History]


Verapamil [Calan] 20 mg PO TID 04/10/14 [History]


Oxybutynin Chloride 5 mg PO TID 02/18/15 [History]


Gabapentin [Neurontin] 600 mg PO BEDTIME 08/05/15 [History]


Pantoprazole [ProTONIX***] 40 mg PO BID 10/23/15 [History]


Acetaminophen [Tylenol Arthritis] 650 mg PO Q8H PRN 11/10/16 [History]


Triamcinolone Acetonide [Triamcinolone Acetonide 0.1% Crm] 1 applic TOP TID  [History]


Acetaminophen/HYDROcodone [Norco 325-5 MG] 1 tab PO Q4H PRN 07/15/19 [History]


atorvaSTATin [Lipitor] 10 mg PO BEDTIME 07/15/19 [History]


buPROPion HCl [Wellbutrin SR] 150 mg PO BID 07/15/19 [History]











Past Medical History


HEENT History: Reports: Cataract, Hard of Hearing, Impaired Vision, Macular 

Degeneration


Other HEENT History: wears glasses


Cardiovascular History: Reports: Arrhythmia, High Cholesterol, Hypertension, 

SOB on Exertion


Respiratory History: Reports: Asthma, COPD, SOB


Gastrointestinal History: Reports: GERD, Hiatal Hernia


Other Gastrointestinal History: Barretts esoph


Genitourinary History: Reports: Renal Disease, Urinary Incontinence


Other Genitourinary History: stage 3 kidney


OB/GYN History: Reports: Pregnancy


Musculoskeletal History: Reports: Arthritis, Fracture, Neck Pain, Chronic, 

Osteoarthritis


Neurological History: Reports: Headaches, Chronic


Other Neuro History: blood clot in brain (2016) resolved on it's own


Psychiatric History: Reports: Anxiety, Mood Swings


Endocrine/Metabolic History: Reports: Diabetes, Type II, Obesity/BMI 30+, 

Vitamin D Deficiency


Other Endocrine/Metabolic History: BS this am 100


Hematologic History: Reports: Anemia, Blood Transfusion(s)


Dermatologic History: Reports: Eczema





- Infectious Disease History


Infectious Disease History: Reports: Chicken Pox





- Past Surgical History


Head Surgeries/Procedures: Reports: None


HEENT Surgical History: Reports: Cataract Surgery, Eye Surgery


Cardiovascular Surgical History: Reports: None


Respiratory Surgical History: Reports: None


GI Surgical History: Reports: Colonoscopy, EGD, Esophageal Dilatation, Hernia 

Repair/Other, Nissen Fundoplication


Female  Surgical History: Reports:  Section


Endocrine Surgical History: Reports: None


Neurological Surgical History: Reports: None


Musculoskeletal Surgical History: Reports: Shoulder Surgery, Other (See Below)


Other Musculoskeletal Surgeries/Procedures:: R shoulder surgery


Dermatological Surgical History: Reports: None





Social & Family History





- Family History


Family Medical History: Noncontributory





- Tobacco Use


Smoking Status *Q: Never Smoker


Second Hand Smoke Exposure: No





- Caffeine Use


Caffeine Use: Reports: Soda, Tea





- Recreational Drug Use


Recreational Drug Use: No





H&P Review of Systems





- Review of Systems:


Review Of Systems: See Below


General: Reports: Fever, Chills, Malaise, Weakness


HEENT: Reports: No Symptoms


Pulmonary: Reports: Cough.  Denies: Shortness of Breath, Wheezing, Pleuritic 

Chest Pain, Sputum, Hemoptysis


Cardiovascular: Reports: No Symptoms


Gastrointestinal: Reports: Difficulty Swallowing.  Denies: Abdominal Pain, 

Decreased Appetite, Hematemesis, Hematochezia, Melena, Nausea, Vomiting


Genitourinary: Reports: No Symptoms


Musculoskeletal: Reports: No Symptoms


Skin: Reports: No Symptoms


Psychiatric: Reports: No Symptoms


Neurological: Reports: No Symptoms


Hematologic/Lymphatic: Reports: No Symptoms


Immunologic: Reports: No Symptoms





Exam





- Exam


Exam: See Below





- Vital Signs


Vital Signs: 


 Last Vital Signs











Temp  102.2 F H  19 06:57


 


Pulse  95   19 06:57


 


Resp  16   19 06:57


 


BP  142/50 H  19 06:57


 


Pulse Ox  94 L  19 06:57











Weight: 149 lb





- Exam


Quality Assessment: DVT Prophylaxis


General: Alert, Oriented, Cooperative, Mild Distress


HEENT: Conjunctiva Clear, Hearing Intact, Normal Nasal Septum, Posterior 

Pharynx Clear, Pupils Equal.  No: Mucosa Moist & Pink


Neck: Supple, Trachea Midline, +2 Carotid Pulse wo Bruit


Lungs: Decreased Breath Sounds, Rhonchi.  No: Rales, Rub, Wheezing


Cardiovascular: Regular Rate, Regular Rhythm, Normal S1, Normal S2


GI/Abdominal Exam: Soft, Non-Tender, No Organomegaly, No Distention


Extremities: Non-Tender, No Pedal Edema


Skin: Warm, Dry, Intact


Neurological: Cranial Nerves Intact, Strength Equal Bilateral, Normal Speech, 

Normal Tone, Sensation Intact.  No: Focal Deficit


Neuro Extensive - Mental Status: Alert, Oriented x3, Normal Mood/Affect, Normal 

Cognition, Memory Intact





- Patient Data


Lab Results Last 24 hrs: 


 Laboratory Results - last 24 hr











  19 Range/Units





  07:29 07:29 07:29 


 


WBC  17.0 H    (4.5-11.0)  K/uL


 


RBC  3.90    (3.30-5.50)  M/uL


 


Hgb  11.2 L    (12.0-15.0)  g/dL


 


Hct  35.6 L    (36.0-48.0)  %


 


MCV  91    (80-98)  fL


 


MCH  29    (27-31)  pg


 


MCHC  32    (32-36)  %


 


Plt Count  386    (150-400)  K/uL


 


Neut % (Auto)  89 H    (36-66)  %


 


Lymph % (Auto)  3 L    (24-44)  %


 


Mono % (Auto)  7 H    (2-6)  %


 


Eos % (Auto)  1 L    (2-4)  %


 


Baso % (Auto)  0    (0-1)  %


 


Sodium   132 L   (140-148)  mmol/L


 


Potassium   4.8   (3.6-5.2)  mmol/L


 


Chloride   97 L   (100-108)  mmol/L


 


Carbon Dioxide   25   (21-32)  mmol/L


 


Anion Gap   14.8 H   (5.0-14.0)  mmol/L


 


BUN   15   (7-18)  mg/dL


 


Creatinine   1.3 H   (0.6-1.0)  mg/dL


 


Est Cr Clr Drug Dosing   29.34   mL/min


 


Estimated GFR (MDRD)   41 L   (>60)  


 


Glucose   183 H   ()  mg/dL


 


Lactic Acid    3.0 H  (0.4-2.0)  mmol/L


 


Calcium   7.8 L   (8.5-10.1)  mg/dL


 


Total Bilirubin   0.3   (0.2-1.0)  mg/dL


 


AST   19   (15-37)  U/L


 


ALT   22   (12-78)  U/L


 


Alkaline Phosphatase   102   ()  U/L


 


Total Protein   7.6   (6.4-8.2)  g/dL


 


Albumin   2.8 L   (3.4-5.0)  g/dL


 


Globulin   4.8 H   (2.3-3.5)  g/dL


 


Albumin/Globulin Ratio   0.6 L   (1.2-2.2)  


 


Urine Color     (YELLOW)  


 


Urine Appearance     (CLEAR)  


 


Urine pH     (5.0-8.0)  


 


Ur Specific Gravity     (1.008-1.030)  


 


Urine Protein     (NEGATIVE)  mg/dL


 


Urine Glucose (UA)     (NEGATIVE)  mg/dL


 


Urine Ketones     (NEGATIVE)  mg/dL


 


Urine Occult Blood     (NEGATIVE)  


 


Urine Nitrite     (NEGATIVE)  


 


Urine Bilirubin     (NEGATIVE)  


 


Urine Urobilinogen     (0.2-1.0)  EU/dL


 


Ur Leukocyte Esterase     (NEGATIVE)  


 


Urine RBC     (0-5)  


 


Urine WBC     (0-5)  


 


Ur Epithelial Cells     


 


Amorphous Sediment     


 


Urine Bacteria     


 


Urine Mucus     


 


Urine Other     














  19 Range/Units





  07:48 


 


WBC   (4.5-11.0)  K/uL


 


RBC   (3.30-5.50)  M/uL


 


Hgb   (12.0-15.0)  g/dL


 


Hct   (36.0-48.0)  %


 


MCV   (80-98)  fL


 


MCH   (27-31)  pg


 


MCHC   (32-36)  %


 


Plt Count   (150-400)  K/uL


 


Neut % (Auto)   (36-66)  %


 


Lymph % (Auto)   (24-44)  %


 


Mono % (Auto)   (2-6)  %


 


Eos % (Auto)   (2-4)  %


 


Baso % (Auto)   (0-1)  %


 


Sodium   (140-148)  mmol/L


 


Potassium   (3.6-5.2)  mmol/L


 


Chloride   (100-108)  mmol/L


 


Carbon Dioxide   (21-32)  mmol/L


 


Anion Gap   (5.0-14.0)  mmol/L


 


BUN   (7-18)  mg/dL


 


Creatinine   (0.6-1.0)  mg/dL


 


Est Cr Clr Drug Dosing   mL/min


 


Estimated GFR (MDRD)   (>60)  


 


Glucose   ()  mg/dL


 


Lactic Acid   (0.4-2.0)  mmol/L


 


Calcium   (8.5-10.1)  mg/dL


 


Total Bilirubin   (0.2-1.0)  mg/dL


 


AST   (15-37)  U/L


 


ALT   (12-78)  U/L


 


Alkaline Phosphatase   ()  U/L


 


Total Protein   (6.4-8.2)  g/dL


 


Albumin   (3.4-5.0)  g/dL


 


Globulin   (2.3-3.5)  g/dL


 


Albumin/Globulin Ratio   (1.2-2.2)  


 


Urine Color  Yellow  (YELLOW)  


 


Urine Appearance  Clear  (CLEAR)  


 


Urine pH  5.5  (5.0-8.0)  


 


Ur Specific Gravity  1.025  (1.008-1.030)  


 


Urine Protein  Negative  (NEGATIVE)  mg/dL


 


Urine Glucose (UA)  Negative  (NEGATIVE)  mg/dL


 


Urine Ketones  Negative  (NEGATIVE)  mg/dL


 


Urine Occult Blood  Trace-lysed H  (NEGATIVE)  


 


Urine Nitrite  Negative  (NEGATIVE)  


 


Urine Bilirubin  Negative  (NEGATIVE)  


 


Urine Urobilinogen  0.2  (0.2-1.0)  EU/dL


 


Ur Leukocyte Esterase  Negative  (NEGATIVE)  


 


Urine RBC  0-5  (0-5)  


 


Urine WBC  0-5  (0-5)  


 


Ur Epithelial Cells  Few  


 


Amorphous Sediment  Not seen  


 


Urine Bacteria  Few  


 


Urine Mucus  Not seen  


 


Urine Other    











Result Diagrams: 


 19 07:29





 19 07:29





*Q Meaningful Use (ADM)





- VTE Risk Assess *Q


Each Risk Factor Represents 1 Point: Obesity ( BMI > 25 kg/m2), Serious lung 

disease including pneumonia


Total Score 1 Point Risk Factors: 2


Each Risk Factor Represents 2 Points: Age 60 - 74 Years


Total Score 2 Point Risk Factors: 2


Each Risk Factor Represents 3 Points: None


Total Score 3 Point Risk Factors: 0


Each Risk Factor Represents 5 Points: None


Total Score 5 Point Risk Factors: 0


Venous Thromboembolism Risk Factor Score *Q: 4


Problem List Initiated/Reviewed/Updated: Yes


Orders Last 24hrs: 


 Active Orders 24 hr











 Category Date Time Status


 


 Patient Status Manage Transfer [TRANSFER] Routine ADT  19 08:48 Ordered


 


 CULTURE BLOOD [BC] Urgent Lab  19 07:27 Received


 


 CULTURE BLOOD [BC] Urgent Lab  19 08:17 Received


 


 Azithromycin [Zithromax] 500 mg Med  19 08:09 Active





 Sodium Chloride 0.9% [Normal Saline] 250 ml   





 IV ONETIME   


 


 Sodium Chloride 0.9% [Normal Saline] 1,000 ml Med  19 08:15 Active





 IV ASDIRECTED   


 


 Blood Culture x2 Reflex Set [OM.PC] Urgent Oth  19 08:07 Ordered


 


 Resuscitation Status Routine Resus Stat  19 08:51 Ordered








 Medication Orders





Sodium Chloride (Normal Saline)  1,000 mls @ 500 mls/hr IV ASDIRECTED [facility]


   Last Admin: 19 08:40  Dose: 500 mls/hr


Azithromycin 500 mg/ Sodium (Chloride)  250 mls @ 250 mls/hr IV ONETIME ONE


   Stop: 19 09:08








Assessment/Plan Comment:: 





ASSESSMENT AND PLAN





ASPIRATION PNEUMONIA-episode of choking and coughing with eating yesterday. 

Very weak today with fever, leucytosis, and bibasal infiltrates on chest x-ray.


-Cultures pending


-IV fluids for hydration


-IV Unasyn and azithromycin


-Speech therapy consult to evaluate for aspiration





TYPE 2 DIABETES MELLITUS


-Hold metformin


-4 times a day glucometers


-Low-dose sliding scale Humalog





MAINTENANCE ISSUES


-DVT prophylaxis; Lovenox 40 mg subcutaneous daily


-GI prophylaxis; continue outpatient PPI therapy


-Espinoza catheter; not indicated


-Nutrition; mechanical soft, nectar thickened liquids


-Nicotine dependence; not required





CODE STATUS-FULL CODE





ADMISSION STATUS-patient will be admitted to inpatient status, expect at least 

a 2 night hospital stay for evaluation and management of problems as outlined 

above.  At the time of this admission I do not reasonably expected evaluation 

and management of this problem will require more than a 96 hour hospital stay.





DISPOSITION-anticipate discharge to home after the hospital stay.





PRIMARY CARE PROVIDER-





- Mortality Measure


Prognosis:: Good

## 2019-12-09 NOTE — EDM.PDOC
ED HPI GENERAL MEDICAL PROBLEM





- General


Chief Complaint: General


Stated Complaint: FELL AT HOME


Time Seen by Provider: 19 07:10


Source of Information: Reports: Patient, EMS, Family


History Limitations: Reports: No Limitations





- History of Present Illness


INITIAL COMMENTS - FREE TEXT/NARRATIVE: 





69-year-old female brought in by EMS after developing significant weakness, 

fever, tremors and having several falls this morning. Her legs were shaking so 

bad this morning she couldn't bear weight. She does not have any significant 

pain at this time, she has a moderate cough but no shortness of breath, denies 

nausea or vomiting, urinary symptoms or rash. She did have a flu and Pneumovax 

this year.


Onset: Unknown/Unsure


Associated Symptoms: Reports: Cough, Fever/Chills, Weakness.  Denies: Confusion

, Chest Pain, Nausea/Vomiting, Shortness of Breath





- Related Data


 Allergies











Allergy/AdvReac Type Severity Reaction Status Date / Time


 


codeine Allergy Intermediate Rash Verified 19 07:05


 


iodine Allergy Intermediate Rash Verified 19 07:05


 


Iodinated Contrast Media Allergy  Hives Verified 19 07:05





[Iodinated Contrast Media -     





IV Dye]     


 


isosorbide [Isosorbide] AdvReac  Cough Verified 19 07:05


 


midazolam HCl [From Versed] AdvReac  Confusion Verified 19 07:05


 


NSAIDS (Non-Steroidal AdvReac  Renal Verified 19 07:05





Anti-Inflamma   Failure  











Home Meds: 


 Home Meds





Albuterol Sulfate [Proair Hfa] 2 puff IH QID PRN 10/26/13 [History]


Aspirin [Adult Low Dose Aspirin EC] 81 mg PO DAILY 10/26/13 [History]


Cholecalciferol (Vitamin D3) [Vitamin D3] 1,000 units PO BID 10/26/13 [History]


Gabapentin [Neurontin] 300 mg PO TID 10/26/13 [History]


Loratadine [Allergy] 10 mg PO ACBRK PRN 10/26/13 [History]


Montelukast [Singulair] 10 mg PO BEDTIME 10/26/13 [History]


Polyethylene Glycol 3350 [MiraLAX] 17 gm PO DAILY PRN 10/26/13 [History]


metFORMIN [Glucophage] 1,000 mg PO PCBREAKFAST 10/26/13 [History]


traZODone 50 mg PO BEDTIME 10/26/13 [History]


Verapamil [Calan] 20 mg PO TID 04/10/14 [History]


Oxybutynin Chloride 5 mg PO TID 02/18/15 [History]


Gabapentin [Neurontin] 600 mg PO BEDTIME 08/05/15 [History]


Pantoprazole [ProTONIX***] 40 mg PO BID 10/23/15 [History]


Acetaminophen [Tylenol Arthritis] 650 mg PO Q8H PRN 11/10/16 [History]


Triamcinolone Acetonide [Triamcinolone Acetonide 0.1% Crm] 1 applic TOP TID  [History]


Acetaminophen/HYDROcodone [Norco 325-5 MG] 1 tab PO Q4H PRN 07/15/19 [History]


atorvaSTATin [Lipitor] 10 mg PO BEDTIME 07/15/19 [History]


buPROPion HCl [Wellbutrin SR] 150 mg PO BID 07/15/19 [History]











Past Medical History


HEENT History: Reports: Cataract, Hard of Hearing, Impaired Vision, Macular 

Degeneration


Other HEENT History: wears glasses


Cardiovascular History: Reports: Arrhythmia, High Cholesterol, Hypertension, 

SOB on Exertion


Respiratory History: Reports: Asthma, COPD, SOB


Gastrointestinal History: Reports: GERD, Hiatal Hernia


Other Gastrointestinal History: Barretts esoph


Genitourinary History: Reports: Renal Disease, Urinary Incontinence


Other Genitourinary History: stage 3 kidney


OB/GYN History: Reports: Pregnancy


Musculoskeletal History: Reports: Arthritis, Fracture, Neck Pain, Chronic, 

Osteoarthritis


Neurological History: Reports: Headaches, Chronic


Other Neuro History: blood clot in brain (2016) resolved on it's own


Psychiatric History: Reports: Anxiety, Mood Swings


Endocrine/Metabolic History: Reports: Diabetes, Type II, Obesity/BMI 30+, 

Vitamin D Deficiency


Other Endocrine/Metabolic History: BS this am 100


Hematologic History: Reports: Anemia, Blood Transfusion(s)


Dermatologic History: Reports: Eczema





- Infectious Disease History


Infectious Disease History: Reports: Chicken Pox





- Past Surgical History


Head Surgeries/Procedures: Reports: None


HEENT Surgical History: Reports: Cataract Surgery, Eye Surgery


Cardiovascular Surgical History: Reports: None


Respiratory Surgical History: Reports: None


GI Surgical History: Reports: Colonoscopy, EGD, Esophageal Dilatation, Hernia 

Repair/Other, Nissen Fundoplication


Female  Surgical History: Reports:  Section


Endocrine Surgical History: Reports: None


Neurological Surgical History: Reports: None


Musculoskeletal Surgical History: Reports: Shoulder Surgery, Other (See Below)


Other Musculoskeletal Surgeries/Procedures:: R shoulder surgery


Dermatological Surgical History: Reports: None





Social & Family History





- Family History


Family Medical History: Noncontributory





- Tobacco Use


Smoking Status *Q: Never Smoker


Second Hand Smoke Exposure: No





- Caffeine Use


Caffeine Use: Reports: Soda, Tea





- Recreational Drug Use


Recreational Drug Use: No





ED ROS GENERAL





- Review of Systems


Review Of Systems: See Below


Constitutional: Reports: Fever, Chills, Malaise


HEENT: Denies: Rhinitis, Throat Pain


Respiratory: Reports: Cough.  Denies: Shortness of Breath, Sputum


Cardiovascular: Denies: Chest Pain


GI/Abdominal: Denies: Abdominal Pain, Nausea, Vomiting


Musculoskeletal: Reports: Other (Lower extremities are weak, she did fall onto 

her left knee but not complaining of significant discomfort)


Skin: Denies: Bruising


Neurological: Reports: Tremors, Difficulty Walking, Weakness.  Denies: Headache

, Syncope


Psychiatric: Reports: No Symptoms





ED EXAM, GENERAL





- Physical Exam


Exam: See Below


Exam Limited By: No Limitations


General Appearance: Alert, No Apparent Distress, Other (Fairly comfortable 

while lying still)


Eye Exam: Bilateral Eye: EOMI


Head: Atraumatic


Neck: Supple, Non-Tender


Respiratory/Chest: No Respiratory Distress, Rales (Bilateral rales are heard in 

the bases, somewhat more pronounced on the left).  No: Wheezing


Cardiovascular: Regular Rate, Rhythm, Systolic Murmur (Faint systolic murmur is 

heard).  No: Tachycardia


GI/Abdominal: Soft, Non-Tender


Extremities: Other (Good passive range of motion of the lower extremities, no 

external findings of trauma such as bruising or asymmetry.  No significant 

palpation tenderness to the knees.)


Neurological: Alert, Oriented


Psychiatric: Normal Affect, Normal Mood


Skin Exam: Warm, Dry





Course





- Vital Signs


Last Recorded V/S: 


 Last Vital Signs











Temp  100.5 F   19 11:50


 


Pulse  73   19 10:48


 


Resp  18   19 10:21


 


BP  108/38 L  19 10:21


 


Pulse Ox  96   19 11:09














- Orders/Labs/Meds


Orders: 


 Active Orders 24 hr











 Category Date Time Status


 


 CULTURE BLOOD [BC] Urgent Lab  19 07:27 Received


 


 CULTURE BLOOD [BC] Urgent Lab  19 08:17 Received


 


 Blood Culture x2 Reflex Set [OM.PC] Urgent Oth  19 08:07 Ordered








 Medication Orders





Acetaminophen (Tylenol)  650 mg PO Q4H PRN


   PRN Reason: Pain (Mild 1-3)/fever


   Last Admin: 19 11:50  Dose: 650 mg


Hydrocodone Bitart/Acetaminophen (Norco 325-5 Mg)  1 tab PO Q4H PRN


   PRN Reason: Pain


Albuterol (Proventil Neb Soln)  2.5 mg NEB Q4H PRN


   PRN Reason: Shortness Of Breath/wheezing


Albuterol/Ipratropium (Duoneb 3.0-0.5 Mg/3 Ml)  3 ml NEB QIDRT [facility]


   Last Admin: 19 10:48  Dose: 3 ml


Aspirin (Halfprin)  81 mg PO DAILY [facility]


   Last Admin: 19 11:38  Dose: 81 mg


Atorvastatin Calcium (Lipitor)  10 mg PO BEDTIME [facility]


Bupropion HCl (Wellbutrin Sr)  150 mg PO BID [facility]


   Last Admin: 19 11:37  Dose: 150 mg


Dextrose (Glutose 15)  15 gm PO ONETIME PRN


   PRN Reason: Hypoglycemia


Dextrose/Water (Dextrose 50% In Water)  50 ml IV ONETIME PRN


   PRN Reason: Hypoglycemia


Enoxaparin Sodium (Lovenox)  30 mg SUBCUT DAILY [facility]


   Last Admin: 19 11:37  Dose: 30 mg


Gabapentin (Neurontin)  300 mg PO TID [facility]


   Last Admin: 19 11:43 Dose:  Not Given


Gabapentin (Neurontin)  600 mg PO BEDTIME [facility]


Azithromycin 500 mg/ Sodium (Chloride)  250 mls @ 250 mls/hr IV Q24H [facility]


Sodium Chloride (Normal Saline)  1,000 mls @ 125 mls/hr IV ASDIRECTED [facility]


Ampicillin Sodium/Sulbactam (Sodium 1.5 gm/ Sodium Chloride)  50 mls @ 100 mls/

hr IV Q6HR [facility]


   Last Admin: 19 11:34  Dose: 100 mls/hr


Insulin Human Lispro (Humalog)  0 unit SUBCUT QIDACANDBED [facility]; Protocol


   Last Admin: 19 11:45  Dose:  


Montelukast Sodium (Singulair)  10 mg PO BEDTIME [facility]


Ondansetron HCl (Zofran)  4 mg IV Q4H PRN


   PRN Reason: Nausea/Vomiting


Oxybutynin Chloride (Oxybutynin)  5 mg PO TID [facility]


   Last Admin: 19 11:37  Dose: 5 mg


Pantoprazole Sodium (Protonix***)  40 mg PO BIDAC [facility]


   Last Admin: 19 11:44 Dose:  Not Given


Polyethylene Glycol (Miralax)  17 gm PO DAILY PRN


   PRN Reason: Constipation


Sodium Chloride (Saline Flush)  10 ml FLUSH ASDIRECTED PRN


   PRN Reason: Keep Vein Open


Trazodone HCl (Trazodone)  50 mg PO BEDTIME [facility]


Triamcinolone Acetonide (Triamcinolone Acetonide 0.1% Crm)  0 gm TOP TID [facility]


   Last Admin: 19 11:39  Dose:  


Verapamil HCl (Calan)  20 mg PO TID [facility]


   Last Admin: 19 11:42 Dose:  Not Given








Labs: 


 Laboratory Tests











  19 Range/Units





  07:29 07:29 07:29 


 


WBC  17.0 H    (4.5-11.0)  K/uL


 


RBC  3.90    (3.30-5.50)  M/uL


 


Hgb  11.2 L    (12.0-15.0)  g/dL


 


Hct  35.6 L    (36.0-48.0)  %


 


MCV  91    (80-98)  fL


 


MCH  29    (27-31)  pg


 


MCHC  32    (32-36)  %


 


Plt Count  386    (150-400)  K/uL


 


Neut % (Auto)  89 H    (36-66)  %


 


Lymph % (Auto)  3 L    (24-44)  %


 


Mono % (Auto)  7 H    (2-6)  %


 


Eos % (Auto)  1 L    (2-4)  %


 


Baso % (Auto)  0    (0-1)  %


 


Sodium   132 L   (140-148)  mmol/L


 


Potassium   4.8   (3.6-5.2)  mmol/L


 


Chloride   97 L   (100-108)  mmol/L


 


Carbon Dioxide   25   (21-32)  mmol/L


 


Anion Gap   14.8 H   (5.0-14.0)  mmol/L


 


BUN   15   (7-18)  mg/dL


 


Creatinine   1.3 H   (0.6-1.0)  mg/dL


 


Est Cr Clr Drug Dosing   29.34   mL/min


 


Estimated GFR (MDRD)   41 L   (>60)  


 


Glucose   183 H   ()  mg/dL


 


Lactic Acid    3.0 H  (0.4-2.0)  mmol/L


 


Calcium   7.8 L   (8.5-10.1)  mg/dL


 


Total Bilirubin   0.3   (0.2-1.0)  mg/dL


 


AST   19   (15-37)  U/L


 


ALT   22   (12-78)  U/L


 


Alkaline Phosphatase   102   ()  U/L


 


Total Protein   7.6   (6.4-8.2)  g/dL


 


Albumin   2.8 L   (3.4-5.0)  g/dL


 


Globulin   4.8 H   (2.3-3.5)  g/dL


 


Albumin/Globulin Ratio   0.6 L   (1.2-2.2)  


 


Urine Color     (YELLOW)  


 


Urine Appearance     (CLEAR)  


 


Urine pH     (5.0-8.0)  


 


Ur Specific Gravity     (1.008-1.030)  


 


Urine Protein     (NEGATIVE)  mg/dL


 


Urine Glucose (UA)     (NEGATIVE)  mg/dL


 


Urine Ketones     (NEGATIVE)  mg/dL


 


Urine Occult Blood     (NEGATIVE)  


 


Urine Nitrite     (NEGATIVE)  


 


Urine Bilirubin     (NEGATIVE)  


 


Urine Urobilinogen     (0.2-1.0)  EU/dL


 


Ur Leukocyte Esterase     (NEGATIVE)  


 


Urine RBC     (0-5)  


 


Urine WBC     (0-5)  


 


Ur Epithelial Cells     


 


Amorphous Sediment     


 


Urine Bacteria     


 


Urine Mucus     


 


Urine Other     














  19 Range/Units





  07:48 


 


WBC   (4.5-11.0)  K/uL


 


RBC   (3.30-5.50)  M/uL


 


Hgb   (12.0-15.0)  g/dL


 


Hct   (36.0-48.0)  %


 


MCV   (80-98)  fL


 


MCH   (27-31)  pg


 


MCHC   (32-36)  %


 


Plt Count   (150-400)  K/uL


 


Neut % (Auto)   (36-66)  %


 


Lymph % (Auto)   (24-44)  %


 


Mono % (Auto)   (2-6)  %


 


Eos % (Auto)   (2-4)  %


 


Baso % (Auto)   (0-1)  %


 


Sodium   (140-148)  mmol/L


 


Potassium   (3.6-5.2)  mmol/L


 


Chloride   (100-108)  mmol/L


 


Carbon Dioxide   (21-32)  mmol/L


 


Anion Gap   (5.0-14.0)  mmol/L


 


BUN   (7-18)  mg/dL


 


Creatinine   (0.6-1.0)  mg/dL


 


Est Cr Clr Drug Dosing   mL/min


 


Estimated GFR (MDRD)   (>60)  


 


Glucose   ()  mg/dL


 


Lactic Acid   (0.4-2.0)  mmol/L


 


Calcium   (8.5-10.1)  mg/dL


 


Total Bilirubin   (0.2-1.0)  mg/dL


 


AST   (15-37)  U/L


 


ALT   (12-78)  U/L


 


Alkaline Phosphatase   ()  U/L


 


Total Protein   (6.4-8.2)  g/dL


 


Albumin   (3.4-5.0)  g/dL


 


Globulin   (2.3-3.5)  g/dL


 


Albumin/Globulin Ratio   (1.2-2.2)  


 


Urine Color  Yellow  (YELLOW)  


 


Urine Appearance  Clear  (CLEAR)  


 


Urine pH  5.5  (5.0-8.0)  


 


Ur Specific Gravity  1.025  (1.008-1.030)  


 


Urine Protein  Negative  (NEGATIVE)  mg/dL


 


Urine Glucose (UA)  Negative  (NEGATIVE)  mg/dL


 


Urine Ketones  Negative  (NEGATIVE)  mg/dL


 


Urine Occult Blood  Trace-lysed H  (NEGATIVE)  


 


Urine Nitrite  Negative  (NEGATIVE)  


 


Urine Bilirubin  Negative  (NEGATIVE)  


 


Urine Urobilinogen  0.2  (0.2-1.0)  EU/dL


 


Ur Leukocyte Esterase  Negative  (NEGATIVE)  


 


Urine RBC  0-5  (0-5)  


 


Urine WBC  0-5  (0-5)  


 


Ur Epithelial Cells  Few  


 


Amorphous Sediment  Not seen  


 


Urine Bacteria  Few  


 


Urine Mucus  Not seen  


 


Urine Other    











Meds: 


Medications











Generic Name Dose Route Start Last Admin





  Trade Name Freq  PRN Reason Stop Dose Admin


 


Acetaminophen  650 mg  19 10:11  19 11:50





  Tylenol  PO   650 mg





  Q4H PRN   Administration





  Pain (Mild 1-3)/fever   





     





     





     


 


Hydrocodone Bitart/Acetaminophen  1 tab  19 10:11  





  Norco 325-5 Mg  PO   





  Q4H PRN   





  Pain   





     





     





     


 


Albuterol  2.5 mg  19 10:11  





  Proventil Neb Soln  NEB   





  Q4H PRN   





  Shortness Of Breath/wheezing   





     





     





     


 


Albuterol/Ipratropium  3 ml  19 11:00  19 10:48





  Duoneb 3.0-0.5 Mg/3 Ml  NEB   3 ml





  QIDRT [facility]   Administration





     





     





     





     


 


Aspirin  81 mg  19 10:11  19 11:38





  Halfprin  PO   81 mg





  DAILY [facility]   Administration





     





     





     





     


 


Atorvastatin Calcium  10 mg  19 21:00  





  Lipitor  PO   





  BEDTIME [facility]   





     





     





     





     


 


Bupropion HCl  150 mg  19 10:11  19 11:37





  Wellbutrin Sr  PO   150 mg





  BID [facility]   Administration





     





     





     





     


 


Dextrose  15 gm  19 10:11  





  Glutose 15  PO   





  ONETIME PRN   





  Hypoglycemia   





     





     





     


 


Dextrose/Water  50 ml  19 10:11  





  Dextrose 50% In Water  IV   





  ONETIME PRN   





  Hypoglycemia   





     





     





     


 


Enoxaparin Sodium  30 mg  19 10:11  19 11:37





  Lovenox  SUBCUT   30 mg





  DAILY [facility]   Administration





     





     





     





     


 


Gabapentin  300 mg  19 10:11  19 11:43





  Neurontin  PO   Not Given





  TID [facility]   





     





     





     





     


 


Gabapentin  600 mg  19 21:00  





  Neurontin  PO   





  BEDTIME [facility]   





     





     





     





     


 


Azithromycin 500 mg/ Sodium  250 mls @ 250 mls/hr  12/10/19 09:00  





  Chloride  IV   





  Q24H [facility]   





     





     





     





     


 


Sodium Chloride  1,000 mls @ 125 mls/hr  19 10:11  





  Normal Saline  IV   





  ASDIRECTED [facility]   





     





     





     





     


 


Ampicillin Sodium/Sulbactam  50 mls @ 100 mls/hr  19 10:30  19 11:34





  Sodium 1.5 gm/ Sodium Chloride  IV   100 mls/hr





  Q6HR [facility]   Administration





     





     





     





     


 


Insulin Human Lispro  0 unit  19 11:00  19 11:45





  Humalog  SUBCUT   Not Given





  QIDACANDBED [facility]   





     





     





  Protocol   





     


 


Montelukast Sodium  10 mg  19 21:00  





  Singulair  PO   





  BEDTIME [facility]   





     





     





     





     


 


Ondansetron HCl  4 mg  19 10:11  





  Zofran  IV   





  Q4H PRN   





  Nausea/Vomiting   





     





     





     


 


Oxybutynin Chloride  5 mg  19 10:11  19 11:37





  Oxybutynin  PO   5 mg





  TID [facility]   Administration





     





     





     





     


 


Pantoprazole Sodium  40 mg  19 10:11  19 11:44





  Protonix***  PO   Not Given





  BIDAC [facility]   





     





     





     





     


 


Polyethylene Glycol  17 gm  19 10:11  





  Miralax  PO   





  DAILY PRN   





  Constipation   





     





     





     


 


Sodium Chloride  10 ml  19 10:11  





  Saline Flush  FLUSH   





  ASDIRECTED PRN   





  Keep Vein Open   





     





     





     


 


Trazodone HCl  50 mg  19 21:00  





  Trazodone  PO   





  BEDTIME [facility]   





     





     





     





     


 


Triamcinolone Acetonide  0 gm  19 10:11  19 11:39





  Triamcinolone Acetonide 0.1% Crm  TOP   Not Given





  TID [facility]   





     





     





     





     


 


Verapamil HCl  20 mg  19 10:11  19 11:42





  Calan  PO   Not Given





  TID [facility]   





     





     





     





     














Discontinued Medications














Generic Name Dose Route Start Last Admin





  Trade Name Freq  PRN Reason Stop Dose Admin


 


Sodium Chloride  1,000 mls @ 500 mls/hr  19 08:15  19 08:40





  Normal Saline  IV   500 mls/hr





  ASDIRECTED [facility]   Administration





     





     





     





     


 


Azithromycin 500 mg/ Sodium  250 mls @ 250 mls/hr  19 08:09  19 09:

18





  Chloride  IV  19 09:08  250 mls/hr





  ONETIME ONE   Administration





     





     





     





     


 


Ceftriaxone Sodium 1 gm/  50 mls @ 100 mls/hr  19 08:09  19 08:41





  Sodium Chloride  IV  19 08:38  100 mls/hr





  ONETIME ONE   Administration





     





     





     





     














- Re-Assessments/Exams


Free Text/Narrative Re-Assessment/Exam: 





19 07:47


Patient has a temperature of 102.2, suffering from frequent falls and very 

weak.  She was hospitalized for pneumonia earlier this year.  A 1 view chest x-

ray, CBC, CMP, lactic acid and mini cath UA were ordered.  Patient is not 

hypotensive or tachycardic, does not need aggressive sepsis treatment at this 

time.





19 08:10


White count is 17,000, lactic acid mildly elevated at 3.0 and chest x-ray shows 

likely infiltrates in the left lower lobe.  Blood cultures were then obtained 

and the patient was given 1 g of Rocephin IV along with 500 mg of IV Zithromax.

  She continued to be borderline hypoxic so O2 was given as well.  Normal 

saline boluses were initiated.


19 08:25


Chest x-ray confirmed bilateral small infiltrates new since previous study.





Departure





- Departure


Time of Disposition: 10:19


Disposition: Admitted As Inpatient 66


Clinical Impression: 


 Weakness





Pneumonia


Qualifiers:


 Aspiration pneumonia type: unspecified Laterality: bilateral Lung location: 

lower lobe of lung 








- Discharge Information





- My Orders


Last 24 Hours: 


My Active Orders





19 07:27


CULTURE BLOOD [BC] Urgent 





19 08:07


Blood Culture x2 Reflex Set [OM.PC] Urgent 





19 08:17


CULTURE BLOOD [BC] Urgent 














- Assessment/Plan


Last 24 Hours: 


My Active Orders





19 07:27


CULTURE BLOOD [BC] Urgent 





19 08:07


Blood Culture x2 Reflex Set [OM.PC] Urgent 





19 08:17


CULTURE BLOOD [BC] Urgent

## 2019-12-09 NOTE — CRLCR
INDICATION:



Fever; cough.



COMPARISON:



Chest radiographs 07/15/2019.



TECHNIQUE:



Portable AP chest.



FINDINGS:



Patchy infiltrates both lower lobes; relatively new when compared to the 

previous study. Mild cardiomegaly. No evidence of CHF. No pneumothorax or 

pleural effusion. Shoulder arthroplasty on the right.



IMPRESSION:



Infiltrates both lower lobes.



Dictated by Chencho Salter MD @ Dec  9 2019  7:58AM



Signed by Dr. Chencho Salter @ Dec  9 2019  7:59AM

## 2019-12-10 RX ADMIN — SODIUM CHLORIDE SCH MLS/HR: 9 INJECTION, SOLUTION INTRAVENOUS at 21:59

## 2019-12-10 RX ADMIN — Medication SCH CAP: at 09:12

## 2019-12-10 RX ADMIN — INSULIN LISPRO SCH: 100 INJECTION, SOLUTION INTRAVENOUS; SUBCUTANEOUS at 16:20

## 2019-12-10 RX ADMIN — TRIAMCINOLONE ACETONIDE SCH: 1 CREAM TOPICAL at 13:22

## 2019-12-10 RX ADMIN — INSULIN LISPRO SCH: 100 INJECTION, SOLUTION INTRAVENOUS; SUBCUTANEOUS at 07:03

## 2019-12-10 RX ADMIN — BUPROPION HYDROCHLORIDE SCH MG: 150 TABLET, EXTENDED RELEASE ORAL at 21:48

## 2019-12-10 RX ADMIN — TRIAMCINOLONE ACETONIDE SCH: 1 CREAM TOPICAL at 21:12

## 2019-12-10 RX ADMIN — INSULIN LISPRO SCH UNITS: 100 INJECTION, SOLUTION INTRAVENOUS; SUBCUTANEOUS at 11:07

## 2019-12-10 RX ADMIN — SODIUM CHLORIDE SCH MLS/HR: 9 INJECTION, SOLUTION INTRAVENOUS at 09:23

## 2019-12-10 RX ADMIN — TRIAMCINOLONE ACETONIDE SCH DOSE: 1 CREAM TOPICAL at 09:14

## 2019-12-10 RX ADMIN — INSULIN LISPRO SCH: 100 INJECTION, SOLUTION INTRAVENOUS; SUBCUTANEOUS at 21:46

## 2019-12-10 RX ADMIN — SODIUM CHLORIDE SCH MLS/HR: 9 INJECTION, SOLUTION INTRAVENOUS at 03:42

## 2019-12-10 RX ADMIN — Medication SCH CAP: at 21:26

## 2019-12-10 RX ADMIN — SODIUM CHLORIDE SCH MLS/HR: 9 INJECTION, SOLUTION INTRAVENOUS at 15:48

## 2019-12-10 RX ADMIN — BUPROPION HYDROCHLORIDE SCH MG: 150 TABLET, EXTENDED RELEASE ORAL at 09:16

## 2019-12-10 NOTE — PCM.PN
- General Info


Date of Service: 12/10/19


Subjective Update: 





Ms. Sheehan has improved since admission yesterday, energy level is better as 

well as her appetite. She did have temperature elevation after admission but 

has been afebrile since then. White blood cell count has normalized. She 

continues to require supplemental oxygen to maintain adequate oxygenation.


Functional Status: Reports: Tolerating Diet, Ambulating, Urinating





- Review of Systems


General: Reports: Weakness.  Denies: Fever, Chills


Pulmonary: Reports: Shortness of Breath, Cough.  Denies: Pleuritic Chest Pain, 

Sputum, Hemoptysis, Wheezing


Cardiovascular: Reports: Dyspnea on Exertion.  Denies: Chest Pain, Palpitations

, Orthopnea, PND, Edema


Gastrointestinal: Reports: No Symptoms





- Patient Data


Vitals - Most Recent: 


 Last Vital Signs











Temp  99.0 F   12/10/19 02:00


 


Pulse  74   12/10/19 06:00


 


Resp  18   12/10/19 06:00


 


BP  119/40 L  12/10/19 02:00


 


Pulse Ox  96   12/10/19 07:57











Weight - Most Recent: 148 lb


I&O - Last 24 Hours: 


 Intake & Output











 12/09/19 12/10/19 12/10/19





 22:59 06:59 14:59


 


Intake Total 2163 1597 


 


Output Total 275 650 


 


Balance 1881 947 











Lab Results Last 24 Hours: 


 Laboratory Results - last 24 hr











  12/09/19 12/10/19 12/10/19 Range/Units





  13:54 04:00 04:00 


 


WBC   9.6   (4.5-11.0)  K/uL


 


RBC   3.48   (3.30-5.50)  M/uL


 


Hgb   10.0 L   (12.0-15.0)  g/dL


 


Hct   32.2 L   (36.0-48.0)  %


 


MCV   93   (80-98)  fL


 


MCH   29   (27-31)  pg


 


MCHC   31 L   (32-36)  %


 


Plt Count   363   (150-400)  K/uL


 


Neut % (Auto)   69 H   (36-66)  %


 


Lymph % (Auto)   16 L   (24-44)  %


 


Mono % (Auto)   11 H   (2-6)  %


 


Eos % (Auto)   3   (2-4)  %


 


Baso % (Auto)   1   (0-1)  %


 


Sodium    139 L  (140-148)  mmol/L


 


Potassium    4.4  (3.6-5.2)  mmol/L


 


Chloride    106  (100-108)  mmol/L


 


Carbon Dioxide    25  (21-32)  mmol/L


 


Anion Gap    12.4  (5.0-14.0)  mmol/L


 


BUN    15  (7-18)  mg/dL


 


Creatinine    1.0  (0.6-1.0)  mg/dL


 


Est Cr Clr Drug Dosing    38.14  mL/min


 


Estimated GFR (MDRD)    55 L  (>60)  


 


Glucose    90  ()  mg/dL


 


Lactic Acid  1.6    (0.4-2.0)  mmol/L


 


Calcium    7.4 L  (8.5-10.1)  mg/dL


 


Magnesium    1.9  (1.8-2.4)  mg/dL











Héctor Results Last 24 Hours: 


 Microbiology











 12/09/19 08:17 Aerobic Blood Culture - Preliminary





 Blood - Venous - Iv Start    NO GROWTH AFTER 1 DAY





 Anaerobic Blood Culture - Preliminary





    NO GROWTH AFTER 1 DAY


 


 12/09/19 07:27 Aerobic Blood Culture - Preliminary





 Blood - Arm, Left    NO GROWTH AFTER 1 DAY





 Anaerobic Blood Culture - Preliminary





    NO GROWTH AFTER 1 DAY











Med Orders - Current: 


 Current Medications





Acetaminophen (Tylenol)  650 mg PO Q4H PRN


   PRN Reason: Pain (Mild 1-3)/fever


   Last Admin: 12/09/19 11:50 Dose:  650 mg


Hydrocodone Bitart/Acetaminophen (Norco 325-5 Mg)  0.5 tab PO Q4H PRN


   PRN Reason: Pain


Albuterol (Proventil Neb Soln)  2.5 mg NEB Q4H PRN


   PRN Reason: Shortness Of Breath/wheezing


Albuterol/Ipratropium (Duoneb 3.0-0.5 Mg/3 Ml)  3 ml NEB QIDRT [facility]


   Last Admin: 12/10/19 07:00 Dose:  3 ml


Aspirin (Halfprin)  81 mg PO DAILY [facility]


   Last Admin: 12/10/19 09:13 Dose:  81 mg


Atorvastatin Calcium (Lipitor)  10 mg PO BEDTIME [facility]


   Last Admin: 12/09/19 20:00 Dose:  10 mg


Bupropion HCl (Wellbutrin Sr)  150 mg PO BID [facility]


   Last Admin: 12/10/19 09:16 Dose:  150 mg


Dextrose (Glutose 15)  15 gm PO ONETIME PRN


   PRN Reason: Hypoglycemia


Dextrose/Water (Dextrose 50% In Water)  50 ml IV ONETIME PRN


   PRN Reason: Hypoglycemia


Enoxaparin Sodium (Lovenox)  40 mg SUBCUT DAILY [facility]


Gabapentin (Neurontin)  300 mg PO TID [facility]


   Last Admin: 12/10/19 09:13 Dose:  300 mg


Gabapentin (Neurontin)  600 mg PO BEDTIME [facility]


   Last Admin: 12/09/19 20:00 Dose:  600 mg


Azithromycin 500 mg/ Sodium (Chloride)  250 mls @ 250 mls/hr IV Q24H [facility]


   Last Admin: 12/10/19 09:20 Dose:  250 mls/hr


Ampicillin Sodium/Sulbactam (Sodium 1.5 gm/ Sodium Chloride)  50 mls @ 100 mls/

hr IV Q6HR [facility]


   Last Admin: 12/10/19 09:23 Dose:  100 mls/hr


Insulin Human Lispro (Humalog)  0 unit SUBCUT QIDACANDBED [facility]; Protocol


   Last Admin: 12/10/19 07:03 Dose:  Not Given


Lactobacillus Rhamnosus (Culturelle)  1 cap PO BID [facility]


   Last Admin: 12/10/19 09:12 Dose:  1 cap


Montelukast Sodium (Singulair)  10 mg PO BEDTIME [facility]


   Last Admin: 12/09/19 20:00 Dose:  10 mg


Ondansetron HCl (Zofran)  4 mg IV Q4H PRN


   PRN Reason: Nausea/Vomiting


Oxybutynin Chloride (Oxybutynin)  5 mg PO TID [facility]


   Last Admin: 12/10/19 09:14 Dose:  5 mg


Pantoprazole Sodium (Protonix***)  40 mg PO BIDAC [facility]


   Last Admin: 12/10/19 07:21 Dose:  40 mg


Polyethylene Glycol (Miralax)  17 gm PO DAILY PRN


   PRN Reason: Constipation


Sodium Chloride (Saline Flush)  10 ml FLUSH ASDIRECTED PRN


   PRN Reason: Keep Vein Open


Trazodone HCl (Trazodone)  50 mg PO BEDTIME [facility]


   Last Admin: 12/09/19 20:00 Dose:  50 mg


Triamcinolone Acetonide (Triamcinolone Acetonide 0.1% Crm)  0 gm TOP TID [facility]


   Last Admin: 12/10/19 09:14 Dose:  1 dose


Verapamil HCl (Calan)  20 mg PO TID [facility]


   Last Admin: 12/10/19 09:12 Dose:  20 mg





Discontinued Medications





Hydrocodone Bitart/Acetaminophen (Norco 325-5 Mg)  1 tab PO Q4H PRN


   PRN Reason: Pain


Enoxaparin Sodium (Lovenox)  30 mg SUBCUT DAILY [facility]


   Last Admin: 12/10/19 09:13 Dose:  30 mg


Sodium Chloride (Normal Saline)  1,000 mls @ 500 mls/hr IV ASDIRECTED [facility]


   Last Admin: 12/09/19 08:40 Dose:  500 mls/hr


Azithromycin 500 mg/ Sodium (Chloride)  250 mls @ 250 mls/hr IV ONETIME ONE


   Stop: 12/09/19 09:08


   Last Admin: 12/09/19 09:18 Dose:  250 mls/hr


Ceftriaxone Sodium 1 gm/ (Sodium Chloride)  50 mls @ 100 mls/hr IV ONETIME ONE


   Stop: 12/09/19 08:38


   Last Admin: 12/09/19 08:41 Dose:  100 mls/hr


Sodium Chloride (Normal Saline)  1,000 mls @ 125 mls/hr IV ASDIRECTED [facility]


   Last Admin: 12/10/19 06:05 Dose:  125 mls/hr











- Exam


Quality Assessment: Supplemental Oxygen, DVT Prophylaxis.  No: Central Line/PICC

, Urine Catheter


General: Alert, Oriented, Cooperative, Mild Distress


Lungs: Clear to Auscultation, Normal Respiratory Effort


Cardiovascular: Regular Rate, Regular Rhythm, Murmurs


GI/Abdominal Exam: Soft, Non-Tender, No Organomegaly, No Distention


Extremities: Non-Tender, No Pedal Edema





- Problem List Review


Problem List Initiated/Reviewed/Updated: Yes





- My Orders


Last 24 Hours: 


My Active Orders





12/09/19 08:51


Resuscitation Status Routine 





12/09/19 10:11


Patient Status [ADT] Routine 


Ambulate [RC] QID 


Blood Glucose Check, Bedside [RC] QIDACANDBED 


Communication Order [RC] STAT 


Diabetes Education [RC] Click to Edit 


Height and Weight [RC] DAILY 


Intake and Output [RC] QSHIFT 


Notify Provider Vital Signs [RC] ASDIRECTED 


Notify Provider [RC] PRN 


Oxygen Therapy [RC] PRN 


Pulse Oximetry [RC] CONTINUOUS 


RT Aerosol Therapy [RC] ASDIRECTED 


Up With Assistance [RC] ASDIRECTED 


Up to Chair [RC] QID 


VTE/DVT Education [RC] Per Unit Routine 


Vital Signs [RC] Q4H 


Consult to Speech Language Pathology [SLP Evaluation and Treatment] [CONS] 

Routine 


Acetaminophen [Tylenol]   650 mg PO Q4H PRN 


Albuterol [Proventil Neb Soln]   2.5 mg NEB Q4H PRN 


Aspirin [Halfprin]   81 mg PO DAILY 


Dextrose 50% in Water   50 ml IV ONETIME PRN 


Dextrose [Glutose 15]   15 gm PO ONETIME PRN 


Gabapentin [Neurontin]   300 mg PO TID 


Ondansetron [Zofran]   4 mg IV Q4H PRN 


Oxybutynin   5 mg PO TID 


Pantoprazole [ProTONIX***]   40 mg PO BIDAC 


Polyethylene Glycol 3350 [MiraLAX]   17 gm PO DAILY PRN 


Sodium Chloride 0.9% [Saline Flush]   10 ml FLUSH ASDIRECTED PRN 


Triamcinolone Acetonide [Triamcinolone Acetonide 0.1% Crm]   0 gm TOP TID 


Verapamil [Calan]   20 mg PO TID 


buPROPion [Wellbutrin SR]   150 mg PO BID 


Peripheral IV Insertion Adult [OM.PC] Routine 





12/09/19 10:30


Ampicillin/Sulbactam Na [Unasyn] 1.5 gm   Sodium Chloride 0.9% [Normal Saline] 

50 ml IV Q6HR 





12/09/19 11:00


Albuterol/Ipratropium [DuoNeb 3.0-0.5 MG/3 ML]   3 ml NEB QIDRT 


Insulin Lispro [HumaLOG]   See Protocol  SUBCUT QIDACANDBED 





12/09/19 13:48


Acetaminophen/HYDROcodone [Norco 325-5 MG]   0.5 tab PO Q4H PRN 





12/09/19 14:15


Lactobacillus Rhamnosus GG [Culturelle]   1 cap PO BID 





12/09/19 14:16


Consult to Physical Therapy [PT Evaluation and Treatment] [CONS] Routine 





12/09/19 21:00


Gabapentin [Neurontin]   600 mg PO BEDTIME 


Montelukast [Singulair]   10 mg PO BEDTIME 


atorvaSTATin [Lipitor]   10 mg PO BEDTIME 


traZODone   50 mg PO BEDTIME 





12/09/19 Lunch


Mechanical Soft Diet [DIET] 





12/10/19 09:00


Azithromycin [Zithromax] 500 mg   Sodium Chloride 0.9% [Normal Saline] 250 ml 

IV Q24H 





12/10/19 09:36


Convert IV to Saline Lock [OM.PC] Routine 





12/11/19 07:30


GLUCOSE POC LAB TO COLLECT [POC] QIDACANDBED 





12/11/19 09:00


Enoxaparin [Lovenox]   40 mg SUBCUT DAILY 





12/11/19 11:30


GLUCOSE POC LAB TO COLLECT [POC] QIDACANDBED 





12/11/19 16:30


GLUCOSE POC LAB TO COLLECT [POC] QIDACANDBED 





12/11/19 21:00


GLUCOSE POC LAB TO COLLECT [POC] QIDACANDBED 





12/12/19 07:30


GLUCOSE POC LAB TO COLLECT [POC] QIDACANDBED 





12/12/19 11:30


GLUCOSE POC LAB TO COLLECT [POC] QIDACANDBED 





12/12/19 16:30


GLUCOSE POC LAB TO COLLECT [POC] QIDACANDBED 





12/12/19 21:00


GLUCOSE POC LAB TO COLLECT [POC] QIDACANDBED 





12/13/19 07:30


GLUCOSE POC LAB TO COLLECT [POC] QIDACANDBED 





12/13/19 11:30


GLUCOSE POC LAB TO COLLECT [POC] QIDACANDBED 





12/13/19 16:30


GLUCOSE POC LAB TO COLLECT [POC] QIDACANDBED 





12/13/19 21:00


GLUCOSE POC LAB TO COLLECT [POC] QIDACANDBED 





12/14/19 07:30


GLUCOSE POC LAB TO COLLECT [POC] QIDACANDBED 














- Plan


Plan:: 





ASSESSMENT AND PLAN





ASPIRATION PNEUMONIA-episode of choking and coughing with eating yesterday. She 

has been afebrile since just after admission with normalization of white blood 

cell count. Improved energy and appetite. Speech therapy consult showed no 

obvious evidence of aspiration.


-Cultures pending


-Saline lock IV


-IV Unasyn and azithromycin





TYPE 2 DIABETES MELLITUS


-Hold metformin


-4 times a day glucometers


-Low-dose sliding scale Humalog





CHRONIC KIDNEY DISEASE STAGE III-renal function has improved with hydration


-TU closely monitor urine output





MAINTENANCE ISSUES


-DVT prophylaxis; Lovenox 40 mg subcutaneous daily


-GI prophylaxis; continue outpatient PPI therapy


-Espinoza catheter; not indicated


-Nutrition; mechanical soft, nectar thickened liquids


-Nicotine dependence; not required





CODE STATUS-FULL CODE





ADMISSION STATUS-patient will be admitted to inpatient status, expect at least 

a 2 night hospital stay for evaluation and management of problems as outlined 

above.  At the time of this admission I do not reasonably expected evaluation 

and management of this problem will require more than a 96 hour hospital stay.





DISPOSITION-anticipate discharge to home after the hospital stay.





PRIMARY CARE PROVIDER-

## 2019-12-11 RX ADMIN — INSULIN LISPRO SCH: 100 INJECTION, SOLUTION INTRAVENOUS; SUBCUTANEOUS at 08:28

## 2019-12-11 RX ADMIN — AMOXICILLIN AND CLAVULANATE POTASSIUM SCH TAB: 875; 125 TABLET, FILM COATED ORAL at 16:19

## 2019-12-11 RX ADMIN — BUPROPION HYDROCHLORIDE SCH MG: 150 TABLET, EXTENDED RELEASE ORAL at 20:35

## 2019-12-11 RX ADMIN — BUPROPION HYDROCHLORIDE SCH MG: 150 TABLET, EXTENDED RELEASE ORAL at 08:32

## 2019-12-11 RX ADMIN — SODIUM CHLORIDE SCH MLS/HR: 9 INJECTION, SOLUTION INTRAVENOUS at 09:05

## 2019-12-11 RX ADMIN — TRIAMCINOLONE ACETONIDE SCH: 1 CREAM TOPICAL at 08:32

## 2019-12-11 RX ADMIN — TRIAMCINOLONE ACETONIDE SCH: 1 CREAM TOPICAL at 14:14

## 2019-12-11 RX ADMIN — Medication SCH CAP: at 20:36

## 2019-12-11 RX ADMIN — SODIUM CHLORIDE SCH MLS/HR: 9 INJECTION, SOLUTION INTRAVENOUS at 04:30

## 2019-12-11 RX ADMIN — TRIAMCINOLONE ACETONIDE SCH: 1 CREAM TOPICAL at 20:35

## 2019-12-11 RX ADMIN — INSULIN LISPRO SCH: 100 INJECTION, SOLUTION INTRAVENOUS; SUBCUTANEOUS at 10:57

## 2019-12-11 RX ADMIN — INSULIN LISPRO SCH: 100 INJECTION, SOLUTION INTRAVENOUS; SUBCUTANEOUS at 16:18

## 2019-12-11 RX ADMIN — INSULIN LISPRO SCH: 100 INJECTION, SOLUTION INTRAVENOUS; SUBCUTANEOUS at 20:00

## 2019-12-11 RX ADMIN — Medication SCH CAP: at 08:30

## 2019-12-11 NOTE — PCM.PN
- General Info


Date of Service: 12/11/19


Subjective Update: 





Ms. Sheehan has been stable since yesterday with good vital signs and no 

significant temperature elevation. Continues to experience difficulty with 

eating and obvious episodes of aspiration with choking and coughing.


Functional Status: Reports: Ambulating, Urinating





- Review of Systems


General: Reports: Weakness.  Denies: Fever, Chills


Pulmonary: Reports: Shortness of Breath, Cough.  Denies: Pleuritic Chest Pain, 

Sputum, Hemoptysis, Wheezing


Cardiovascular: Reports: Dyspnea on Exertion.  Denies: Chest Pain, Palpitations

, Orthopnea, PND, Edema


Gastrointestinal: Reports: No Symptoms





- Patient Data


Vitals - Most Recent: 


 Last Vital Signs











Temp  100.0 F   12/11/19 05:31


 


Pulse  82   12/11/19 07:23


 


Resp  15   12/11/19 05:31


 


BP  133/54 L  12/11/19 05:31


 


Pulse Ox  96   12/11/19 05:31











Weight - Most Recent: 148 lb


I&O - Last 24 Hours: 


 Intake & Output











 12/10/19 12/11/19 12/11/19





 22:59 06:59 14:59


 


Intake Total  200 


 


Balance  200 











Héctor Results Last 24 Hours: 


 Microbiology











 12/09/19 07:27 Aerobic Blood Culture - Preliminary





 Blood - Arm, Left    NO GROWTH AFTER 2 DAYS





 Anaerobic Blood Culture - Preliminary





    NO GROWTH AFTER 2 DAYS


 


 12/09/19 08:17 Aerobic Blood Culture - Preliminary





 Blood - Venous - Iv Start    NO GROWTH AFTER 2 DAYS





 Anaerobic Blood Culture - Preliminary





    NO GROWTH AFTER 2 DAYS











Med Orders - Current: 


 Current Medications





Acetaminophen (Tylenol)  650 mg PO Q4H PRN


   PRN Reason: Pain (Mild 1-3)/fever


   Last Admin: 12/09/19 11:50 Dose:  650 mg


Hydrocodone Bitart/Acetaminophen (Norco 325-5 Mg)  0.5 tab PO Q4H PRN


   PRN Reason: Pain


Albuterol (Proventil Neb Soln)  2.5 mg NEB Q4H PRN


   PRN Reason: Shortness Of Breath/wheezing


Albuterol/Ipratropium (Duoneb 3.0-0.5 Mg/3 Ml)  3 ml NEB QIDRT [facility]


   Last Admin: 12/11/19 07:22 Dose:  3 ml


Aspirin (Halfprin)  81 mg PO DAILY [facility]


   Last Admin: 12/11/19 08:30 Dose:  81 mg


Atorvastatin Calcium (Lipitor)  10 mg PO BEDTIME [facility]


   Last Admin: 12/10/19 21:34 Dose:  10 mg


Bupropion HCl (Wellbutrin Sr)  150 mg PO BID [facility]


   Last Admin: 12/11/19 08:32 Dose:  150 mg


Dextrose (Glutose 15)  15 gm PO ONETIME PRN


   PRN Reason: Hypoglycemia


Dextrose/Water (Dextrose 50% In Water)  50 ml IV ONETIME PRN


   PRN Reason: Hypoglycemia


Enoxaparin Sodium (Lovenox)  40 mg SUBCUT DAILY [facility]


   Last Admin: 12/11/19 08:31 Dose:  40 mg


Gabapentin (Neurontin)  300 mg PO TID [facility]


   Last Admin: 12/11/19 08:31 Dose:  300 mg


Gabapentin (Neurontin)  600 mg PO BEDTIME [facility]


   Last Admin: 12/10/19 21:26 Dose:  600 mg


Insulin Human Lispro (Humalog)  0 unit SUBCUT QIDACANDBED [facility]; Protocol


   Last Admin: 12/11/19 08:28 Dose:  Not Given


Lactobacillus Rhamnosus (Culturelle)  1 cap PO BID [facility]


   Last Admin: 12/11/19 08:30 Dose:  1 cap


Montelukast Sodium (Singulair)  10 mg PO BEDTIME [facility]


   Last Admin: 12/10/19 21:36 Dose:  10 mg


Ondansetron HCl (Zofran)  4 mg IV Q4H PRN


   PRN Reason: Nausea/Vomiting


Oxybutynin Chloride (Oxybutynin)  5 mg PO TID [facility]


   Last Admin: 12/11/19 08:31 Dose:  5 mg


Pantoprazole Sodium (Protonix***)  40 mg PO BIDAC [facility]


   Last Admin: 12/11/19 08:29 Dose:  40 mg


Polyethylene Glycol (Miralax)  17 gm PO DAILY PRN


   PRN Reason: Constipation


Sodium Chloride (Saline Flush)  10 ml FLUSH ASDIRECTED PRN


   PRN Reason: Keep Vein Open


Trazodone HCl (Trazodone)  50 mg PO BEDTIME [facility]


   Last Admin: 12/10/19 21:33 Dose:  50 mg


Triamcinolone Acetonide (Triamcinolone Acetonide 0.1% Crm)  0 gm TOP TID [facility]


   Last Admin: 12/11/19 08:32 Dose:  Not Given


Verapamil HCl (Calan)  20 mg PO TID [facility]


   Last Admin: 12/11/19 08:29 Dose:  20 mg





Discontinued Medications





Hydrocodone Bitart/Acetaminophen (Norco 325-5 Mg)  1 tab PO Q4H PRN


   PRN Reason: Pain


Enoxaparin Sodium (Lovenox)  30 mg SUBCUT DAILY [facility]


   Last Admin: 12/10/19 09:13 Dose:  30 mg


Sodium Chloride (Normal Saline)  1,000 mls @ 500 mls/hr IV ASDIRECTED [facility]


   Last Admin: 12/09/19 08:40 Dose:  500 mls/hr


Azithromycin 500 mg/ Sodium (Chloride)  250 mls @ 250 mls/hr IV ONETIME ONE


   Stop: 12/09/19 09:08


   Last Admin: 12/09/19 09:18 Dose:  250 mls/hr


Ceftriaxone Sodium 1 gm/ (Sodium Chloride)  50 mls @ 100 mls/hr IV ONETIME ONE


   Stop: 12/09/19 08:38


   Last Admin: 12/09/19 08:41 Dose:  100 mls/hr


Azithromycin 500 mg/ Sodium (Chloride)  250 mls @ 250 mls/hr IV Q24H [facility]


   Last Admin: 12/11/19 08:33 Dose:  250 mls/hr


Sodium Chloride (Normal Saline)  1,000 mls @ 125 mls/hr IV ASDIRECTED [facility]


   Last Admin: 12/10/19 06:05 Dose:  125 mls/hr


Ampicillin Sodium/Sulbactam (Sodium 1.5 gm/ Sodium Chloride)  50 mls @ 100 mls/

hr IV Q6HR [facility]


   Last Admin: 12/11/19 09:05 Dose:  100 mls/hr











- Exam


Quality Assessment: DVT Prophylaxis.  No: Supplemental Oxygen


General: Alert, Oriented, Cooperative, Mild Distress


Lungs: Clear to Auscultation, Normal Respiratory Effort


Cardiovascular: Regular Rate, Regular Rhythm, No Murmurs


GI/Abdominal Exam: Soft, Non-Tender, No Organomegaly, No Distention


Extremities: Non-Tender, No Pedal Edema


Skin: Warm, Dry





Sepsis Event Note





- Evaluation


Sepsis Screening Result: No Definite Risk





- Focused Exam


Vital Signs: 


 Vital Signs











  Temp Pulse Resp BP Pulse Ox


 


 12/11/19 07:23   82   


 


 12/11/19 05:31  100.0 F  82  15  133/54 L  96


 


 12/11/19 00:28  99.2 F  82  18  128/43 L  96











Date Exam was Performed: 12/11/19


Time Exam was Performed: 10:08





- Problem List Review


Problem List Initiated/Reviewed/Updated: Yes





- My Orders


Last 24 Hours: 


My Active Orders





12/10/19 09:36


Convert IV to Saline Lock [OM.PC] Routine 





12/11/19 09:00


Enoxaparin [Lovenox]   40 mg SUBCUT DAILY 





12/11/19 10:01


Swallowing Function w Video [CR] Urgent 





12/11/19 10:15


Amoxicillin/Clavulanate K [Augmentin 875 MG/125 MG]   1 tab PO Q12HR 





12/11/19 21:00


GLUCOSE POC LAB TO COLLECT [POC] QIDACANDBED 





12/12/19 07:30


GLUCOSE POC LAB TO COLLECT [POC] QIDACANDBED 





12/12/19 09:00


Azithromycin [Zithromax]   250 mg PO DAILY 





12/12/19 11:30


GLUCOSE POC LAB TO COLLECT [POC] QIDACANDBED 





12/12/19 16:30


GLUCOSE POC LAB TO COLLECT [POC] QIDACANDBED 





12/12/19 21:00


GLUCOSE POC LAB TO COLLECT [POC] QIDACANDBED 





12/13/19 07:30


GLUCOSE POC LAB TO COLLECT [POC] QIDACANDBED 





12/13/19 11:30


GLUCOSE POC LAB TO COLLECT [POC] QIDACANDBED 





12/13/19 16:30


GLUCOSE POC LAB TO COLLECT [POC] QIDACANDBED 





12/13/19 21:00


GLUCOSE POC LAB TO COLLECT [POC] QIDACANDBED 





12/14/19 07:30


GLUCOSE POC LAB TO COLLECT [POC] QIDACANDBED 














- Plan


Plan:: 





ASSESSMENT AND PLAN





ASPIRATION PNEUMONIA-episode of choking and coughing with eating. She has been 

afebrile since just after admission with normalization of white blood cell 

count. Improved energy and appetite. Continues to show evidence of aspiration 

with eating, unable to get a video swallowing study until early next week.


-Cultures pending


-Saline lock IV


-Discontinue IV Unasyn and azithromycin


-Transition to oral azithromycin and Augmentin





TYPE 2 DIABETES MELLITUS


-Hold metformin


-4 times a day glucometers


-Low-dose sliding scale Humalog





CHRONIC KIDNEY DISEASE STAGE III-renal function has improved with hydration


-Continue to closely monitor urine output





MAINTENANCE ISSUES


-DVT prophylaxis; Lovenox 40 mg subcutaneous daily


-GI prophylaxis; continue outpatient PPI therapy


-Espinoza catheter; not indicated


-Nutrition; mechanical soft, nectar thickened liquids


-Nicotine dependence; not required





CODE STATUS-FULL CODE





ADMISSION STATUS-patient will be admitted to inpatient status, expect at least 

a 2 night hospital stay for evaluation and management of problems as outlined 

above.  At the time of this admission I do not reasonably expected evaluation 

and management of this problem will require more than a 96 hour hospital stay.





DISPOSITION-anticipate discharge to home after the hospital stay.





PRIMARY CARE PROVIDER-

## 2019-12-12 VITALS — SYSTOLIC BLOOD PRESSURE: 146 MMHG | DIASTOLIC BLOOD PRESSURE: 64 MMHG

## 2019-12-12 VITALS — HEART RATE: 70 BPM

## 2019-12-12 RX ADMIN — BUPROPION HYDROCHLORIDE SCH MG: 150 TABLET, EXTENDED RELEASE ORAL at 09:08

## 2019-12-12 RX ADMIN — AMOXICILLIN AND CLAVULANATE POTASSIUM SCH TAB: 875; 125 TABLET, FILM COATED ORAL at 07:53

## 2019-12-12 RX ADMIN — TRIAMCINOLONE ACETONIDE SCH: 1 CREAM TOPICAL at 09:09

## 2019-12-12 RX ADMIN — Medication SCH CAP: at 09:07

## 2019-12-12 RX ADMIN — INSULIN LISPRO SCH: 100 INJECTION, SOLUTION INTRAVENOUS; SUBCUTANEOUS at 06:43

## 2019-12-12 NOTE — PCM.DCSUM1
**Discharge Summary





- Hospital Course


Brief History: Ms. Sheehan is a 69-year-old woman who was admitted through the 

emergency department with hypoxia, fever, and leukocytosis secondary to 

bibasilar pneumonia.





- Discharge Data


Discharge Date: 12/12/19


Discharge Disposition: Home, [facility]


Condition: Fair





- Referral to Home Health


Date of Face to Face Encounter: 12/12/19


Reason for Homebound Status: Weakness


Primary Care Physician: 


PCP None





Skilled Need: Nursing, PT, OT





- Discharge Diagnosis/Problem(s)


(1) Pneumonia


SNOMED Code(s): 727247385


   ICD Code: J18.9 - PNEUMONIA, UNSPECIFIED ORGANISM   Status: Acute   Current 

Visit: Yes   


Qualifiers: 


   Aspiration pneumonia type: unspecified   Laterality: bilateral   Lung 

location: lower lobe of lung 





(2) Weakness


SNOMED Code(s): 48197678


   ICD Code: R53.1 - WEAKNESS   Status: Acute   Current Visit: Yes   





(3) Type 2 diabetes mellitus


SNOMED Code(s): 11993892


   ICD Code: E11.9 - TYPE 2 DIABETES MELLITUS WITHOUT COMPLICATIONS   Status: 

Chronic   Current Visit: No   





(4) COPD (chronic obstructive pulmonary disease)


SNOMED Code(s): 09088826


   ICD Code: J44.9 - CHRONIC OBSTRUCTIVE PULMONARY DISEASE, UNSPECIFIED   Status

: Chronic   Current Visit: No   





(5) CKD (chronic kidney disease) stage 3, GFR 30-59 ml/min


SNOMED Code(s): 852531581


   ICD Code: N18.3 - CHRONIC KIDNEY DISEASE, STAGE 3 (MODERATE)   Status: 

Chronic   Current Visit: No   





- Patient Summary/Data


Consults: 


 Consultations





12/09/19 10:11


Consult to Speech Language Pathology [SLP Evaluation and Treatment] [CONS] 

Routine 


   Please Evaluate and Treat


   SLP Reason for Consult: Please evaluate for aspiration


   This query below is only for informational purposes and is not editable.


   Admission Diagnosis/Problem: Pneumonia





12/09/19 14:16


Consult to Physical Therapy [PT Evaluation and Treatment] [CONS] Routine 


   Please Evaluate and Treat.


   PT Reason for Consult: Weakness


   This query below is only for informational purposes and is not editable.


   Admission Diagnosis/Problem: Pneumonia











Hospital Course: 





Ms. Sheehan is a 69-year-old woman who was admitted through the emergency 

department with weakness, fever, and cough, secondary to bilateral pneumonia. 

She reports that she felt well yesterday but she did have an episode of 

significant coughing and choking while eating. She reports that this happens on 

an intermittent basis especially when she is very hungry and rushes when 

eating. Her granddaughter is present with her today and reports that she also 

experienced some choking and coughing with eating breakfast here in the 

emergency department. This morning she was extremely weak and unable to stand, 

she called for help and was brought into the emergency department by EMS. White 

blood cell count is elevated and she did have a documented temperature 

elevation of 102.2. Chest x-ray shows evidence of basilar pneumonia. Cultures 

were obtained in the emergency department and she was started on IV antibiotic 

therapy. Blood cultures remain negative throughout her hospital stay. There was 

concern for possible aspiration so she was given more broad-spectrum antibiotic 

therapy aimed at bacteria present with aspiration. She was seen and evaluated 

by speech therapy initially and they saw no evidence of significant aspiration, 

she continued to show evidence of aspiration during hospitalization. We were 

unable to obtain a video swallowing study during hospital stay as the 

radiologist was not available. She was seen by dietary and changes and diet 

were recommended. She will be scheduled for a video swallowing study on Monday, December 16. Prior to discharge she was transitioned oral antibiotic therapy 

which she tolerated well and was treated with probiotic therapy throughout her 

hospital stay. Activity will be as tolerated and she will make dietary changes 

as recommended by the dietitian. Follow-up appointment will be scheduled with 

Dr. Pichardo within one week.





- Patient Instructions


Diet: Usual Diet as Tolerated


Activity: As Tolerated


Other/Special Instructions: Discharge to home with home care services, 

including physical therapy and occupational therapy. Schedule follow-up 

appointment with Dr. Pichardo within one week. He is scheduled for video 

swallowing study for Monday, December 16. Please schedule follow-up appointment 

with speech therapy following video study.





- Discharge Plan


*PRESCRIPTION DRUG MONITORING PROGRAM REVIEWED*: Not Applicable


*COPY OF PRESCRIPTION DRUG MONITORING REPORT IN PATIENT PILLO: Not Applicable


Prescriptions/Med Rec: 


Amoxicillin/Clavulanate K [Augmentin 875-125 MG] 1 tab PO BIDMEALS #8 tablet


Lactobacillus Rhamnosus GG [Culturelle] 1 cap PO BID #60 cap


Home Medications: 


 Home Meds





Albuterol Sulfate [Proair Hfa] 2 puff IH QID PRN 10/26/13 [History]


Aspirin [Adult Low Dose Aspirin EC] 81 mg PO DAILY 10/26/13 [History]


Cholecalciferol (Vitamin D3) [Vitamin D3] 1,000 units PO BID 10/26/13 [History]


Gabapentin [Neurontin] 300 mg PO TID 10/26/13 [History]


Loratadine [Allergy] 10 mg PO ACBRK PRN 10/26/13 [History]


Montelukast [Singulair] 10 mg PO BEDTIME 10/26/13 [History]


Polyethylene Glycol 3350 [MiraLAX] 17 gm PO DAILY PRN 10/26/13 [History]


metFORMIN [Glucophage] 1,000 mg PO PCBREAKFAST 10/26/13 [History]


traZODone 50 mg PO BEDTIME 10/26/13 [History]


Verapamil [Calan] 20 mg PO TID 04/10/14 [History]


Oxybutynin Chloride 5 mg PO TID 02/18/15 [History]


Gabapentin [Neurontin] 600 mg PO BEDTIME 08/05/15 [History]


Pantoprazole [ProTONIX***] 40 mg PO BID 10/23/15 [History]


Acetaminophen [Tylenol Arthritis] 650 mg PO Q8H PRN 11/10/16 [History]


Triamcinolone Acetonide [Triamcinolone Acetonide 0.1% Crm] 1 applic TOP TID 06/ 28/18 [History]


Acetaminophen/HYDROcodone [Norco 325-5 MG] 1 tab PO Q4H PRN 07/15/19 [History]


atorvaSTATin [Lipitor] 10 mg PO BEDTIME 07/15/19 [History]


buPROPion HCl [Wellbutrin SR] 150 mg PO BID 07/15/19 [History]


Amoxicillin/Clavulanate K [Augmentin 875-125 MG] 1 tab PO BIDMEALS #8 tablet 12/ 12/19 [Rx]


Lactobacillus Rhamnosus GG [Culturelle] 1 cap PO BID #60 cap 12/12/19 [Rx]








Referrals: 


Reece Pichardo MD [Physician] - 12/19/19 1:30 pm (Please arrive 15 minutes 

early to register for your appointment.)





- Discharge Summary/Plan Comment


DC Time >30 min.: No





- Patient Data


Vitals - Most Recent: 


 Last Vital Signs











Temp  99 F   12/12/19 07:00


 


Pulse  70   12/12/19 07:27


 


Resp  18   12/12/19 07:00


 


BP  146/64 H  12/12/19 07:00


 


Pulse Ox  89 L  12/12/19 07:58











Weight - Most Recent: 148 lb


I&O - Last 24 hours: 


 Intake & Output











 12/11/19 12/12/19 12/12/19





 22:59 06:59 14:59


 


Intake Total 1100  


 


Balance 1100  











ROBSON Results - Last 24 hrs: 


 Microbiology











 12/09/19 07:27 Aerobic Blood Culture - Preliminary





 Blood - Arm, Left    NO GROWTH AFTER 3 DAYS





 Anaerobic Blood Culture - Preliminary





    NO GROWTH AFTER 3 DAYS


 


 12/09/19 08:17 Aerobic Blood Culture - Preliminary





 Blood - Venous - Iv Start    NO GROWTH AFTER 3 DAYS





 Anaerobic Blood Culture - Preliminary





    NO GROWTH AFTER 3 DAYS











Med Orders - Current: 


 Current Medications





Acetaminophen (Tylenol)  650 mg PO Q4H PRN


   PRN Reason: Pain (Mild 1-3)/fever


   Last Admin: 12/09/19 11:50 Dose:  650 mg


Hydrocodone Bitart/Acetaminophen (Norco 325-5 Mg)  0.5 tab PO Q4H PRN


   PRN Reason: Pain


Albuterol (Proventil Neb Soln)  2.5 mg NEB Q4H PRN


   PRN Reason: Shortness Of Breath/wheezing


Albuterol/Ipratropium (Duoneb 3.0-0.5 Mg/3 Ml)  3 ml NEB QIDRT [facility]


   Last Admin: 12/12/19 07:27 Dose:  3 ml


Amoxicillin/Clavulanate Potassium (Augmentin 875 Mg/125 Mg)  1 tab PO BIDMEALS 

[facility]


   Last Admin: 12/12/19 07:53 Dose:  1 tab


Aspirin (Halfprin)  81 mg PO DAILY [facility]


   Last Admin: 12/12/19 09:08 Dose:  81 mg


Atorvastatin Calcium (Lipitor)  10 mg PO BEDTIME [facility]


   Last Admin: 12/11/19 20:38 Dose:  10 mg


Azithromycin (Zithromax)  250 mg PO DAILY [facility]


   Last Admin: 12/12/19 09:13 Dose:  250 mg


Bupropion HCl (Wellbutrin Sr)  150 mg PO BID [facility]


   Last Admin: 12/12/19 09:08 Dose:  150 mg


Dextrose (Glutose 15)  15 gm PO ONETIME PRN


   PRN Reason: Hypoglycemia


Dextrose/Water (Dextrose 50% In Water)  50 ml IV ONETIME PRN


   PRN Reason: Hypoglycemia


Enoxaparin Sodium (Lovenox)  40 mg SUBCUT DAILY [facility]


   Last Admin: 12/12/19 09:07 Dose:  40 mg


Gabapentin (Neurontin)  300 mg PO TID [facility]


   Last Admin: 12/12/19 09:07 Dose:  300 mg


Gabapentin (Neurontin)  600 mg PO BEDTIME [facility]


   Last Admin: 12/11/19 20:36 Dose:  600 mg


Insulin Human Lispro (Humalog)  0 unit SUBCUT QIDACANDBED [facility]; Protocol


   Last Admin: 12/12/19 06:43 Dose:  Not Given


Lactobacillus Rhamnosus (Culturelle)  1 cap PO BID [facility]


   Last Admin: 12/12/19 09:07 Dose:  1 cap


Montelukast Sodium (Singulair)  10 mg PO BEDTIME [facility]


   Last Admin: 12/11/19 20:36 Dose:  10 mg


Ondansetron HCl (Zofran)  4 mg IV Q4H PRN


   PRN Reason: Nausea/Vomiting


Oxybutynin Chloride (Oxybutynin)  5 mg PO TID [facility]


   Last Admin: 12/12/19 09:07 Dose:  5 mg


Pantoprazole Sodium (Protonix***)  40 mg PO BIDAC [facility]


   Last Admin: 12/12/19 07:54 Dose:  40 mg


Polyethylene Glycol (Miralax)  17 gm PO DAILY PRN


   PRN Reason: Constipation


Sodium Chloride (Saline Flush)  10 ml FLUSH ASDIRECTED PRN


   PRN Reason: Keep Vein Open


Trazodone HCl (Trazodone)  50 mg PO BEDTIME [facility]


   Last Admin: 12/11/19 20:36 Dose:  50 mg


Triamcinolone Acetonide (Triamcinolone Acetonide 0.1% Crm)  0 gm TOP TID [facility]


   Last Admin: 12/12/19 09:09 Dose:  Not Given


Verapamil HCl (Calan)  20 mg PO TID [facility]


   Last Admin: 12/12/19 09:08 Dose:  20 mg





Discontinued Medications





Hydrocodone Bitart/Acetaminophen (Norco 325-5 Mg)  1 tab PO Q4H PRN


   PRN Reason: Pain


Enoxaparin Sodium (Lovenox)  30 mg SUBCUT DAILY [facility]


   Last Admin: 12/10/19 09:13 Dose:  30 mg


Sodium Chloride (Normal Saline)  1,000 mls @ 500 mls/hr IV ASDIRECTED [facility]


   Last Admin: 12/09/19 08:40 Dose:  500 mls/hr


Azithromycin 500 mg/ Sodium (Chloride)  250 mls @ 250 mls/hr IV ONETIME ONE


   Stop: 12/09/19 09:08


   Last Admin: 12/09/19 09:18 Dose:  250 mls/hr


Ceftriaxone Sodium 1 gm/ (Sodium Chloride)  50 mls @ 100 mls/hr IV ONETIME ONE


   Stop: 12/09/19 08:38


   Last Admin: 12/09/19 08:41 Dose:  100 mls/hr


Azithromycin 500 mg/ Sodium (Chloride)  250 mls @ 250 mls/hr IV Q24H [facility]


   Last Admin: 12/11/19 08:33 Dose:  250 mls/hr


Sodium Chloride (Normal Saline)  1,000 mls @ 125 mls/hr IV ASDIRECTED [facility]


   Last Admin: 12/10/19 06:05 Dose:  125 mls/hr


Ampicillin Sodium/Sulbactam (Sodium 1.5 gm/ Sodium Chloride)  50 mls @ 100 mls/

hr IV Q6HR [facility]


   Last Admin: 12/11/19 09:05 Dose:  100 mls/hr











- Exam


General: Reports: Alert, Oriented, Cooperative, No Acute Distress


Lungs: Reports: Clear to Auscultation, Normal Respiratory Effort


Cardiovascular: Reports: Regular Rate, Regular Rhythm, No Murmurs


GI/Abdominal Exam: Soft, Non-Tender, No Organomegaly, No Distention

## 2021-01-26 ENCOUNTER — HOSPITAL ENCOUNTER (EMERGENCY)
Dept: HOSPITAL 11 - JP.ED | Age: 71
Discharge: HOME | End: 2021-01-26
Payer: MEDICARE

## 2021-01-26 VITALS — DIASTOLIC BLOOD PRESSURE: 55 MMHG | SYSTOLIC BLOOD PRESSURE: 118 MMHG | HEART RATE: 92 BPM

## 2021-01-26 DIAGNOSIS — Z88.8: ICD-10-CM

## 2021-01-26 DIAGNOSIS — E66.9: ICD-10-CM

## 2021-01-26 DIAGNOSIS — S30.1XXA: Primary | ICD-10-CM

## 2021-01-26 DIAGNOSIS — Z79.82: ICD-10-CM

## 2021-01-26 DIAGNOSIS — K21.9: ICD-10-CM

## 2021-01-26 DIAGNOSIS — J44.9: ICD-10-CM

## 2021-01-26 DIAGNOSIS — Z79.899: ICD-10-CM
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
## 2021-01-26 DIAGNOSIS — E78.00: ICD-10-CM

## 2021-01-26 DIAGNOSIS — I10: ICD-10-CM

## 2021-01-26 DIAGNOSIS — M19.90: ICD-10-CM
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
## 2021-01-26 DIAGNOSIS — E11.9: ICD-10-CM

## 2021-01-26 DIAGNOSIS — Z88.5: ICD-10-CM

## 2021-01-26 DIAGNOSIS — Z88.4: ICD-10-CM

## 2021-01-26 DIAGNOSIS — Z91.041: ICD-10-CM

## 2021-01-26 NOTE — EDM.PDOC
ED HPI GENERAL MEDICAL PROBLEM





- General


Chief Complaint: General


Stated Complaint: FELL AT HOME, HIT LEFT SIDE


Time Seen by Provider: 21 15:25


Source of Information: Reports: Patient


History Limitations: Reports: No Limitations





- History of Present Illness


INITIAL COMMENTS - FREE TEXT/NARRATIVE: 





70-year-old female who earlier today stumbled and bumped her left abdominal wall

on the edge of some furniture.  She now has a sore lump that she wants checked. 

No intra-abdominal pain, shortness of breath or chest wall pain.


Onset: Sudden


Duration: Hour(s): (5 hours ago)


Location: Reports: Abdomen


Associated Symptoms: Reports: No Other Symptoms





- Related Data


                                    Allergies











Allergy/AdvReac Type Severity Reaction Status Date / Time


 


codeine Allergy Intermediate Rash Verified 21 15:21


 


iodine Allergy Intermediate Rash Verified 21 15:21


 


Iodinated Contrast Media Allergy  Hives Verified 21 15:21





[Iodinated Contrast Media -     





IV Dye]     


 


isosorbide [Isosorbide] AdvReac  Cough Verified 21 15:21


 


midazolam HCl [From Versed] AdvReac  Confusion Verified 21 15:21


 


NSAIDS (Non-Steroidal AdvReac  Renal Verified 21 15:21





Anti-Inflamma   Failure  











Home Meds: 


                                    Home Meds





Albuterol Sulfate [Proair Hfa] 2 puff IH QID PRN 10/26/13 [History]


Aspirin [Adult Low Dose Aspirin EC] 81 mg PO DAILY 10/26/13 [History]


Cholecalciferol (Vitamin D3) [Vitamin D3] 1,000 units PO BID 10/26/13 [History]


Gabapentin [Neurontin] 300 mg PO TID 10/26/13 [History]


Loratadine [Allergy] 10 mg PO ACBRK PRN 10/26/13 [History]


Montelukast [Singulair] 10 mg PO BEDTIME 10/26/13 [History]


metFORMIN [Glucophage] 1,000 mg PO PCBREAKFAST 10/26/13 [History]


polyethylene glycoL 3350 [MiraLAX] 17 gm PO DAILY PRN 10/26/13 [History]


traZODone 50 mg PO BEDTIME 10/26/13 [History]


Verapamil [Calan] 20 mg PO TID 04/10/14 [History]


Oxybutynin Chloride 5 mg PO TID 02/18/15 [History]


Gabapentin [Neurontin] 600 mg PO BEDTIME 08/05/15 [History]


Pantoprazole [ProTONIX***] 40 mg PO BID 10/23/15 [History]


Acetaminophen [Tylenol Arthritis] 650 mg PO Q8H PRN 11/10/16 [History]


Triamcinolone Acetonide [Triamcinolone Acetonide 0.1% Crm] 1 applic TOP TID 

18 [History]


atorvaSTATin [Lipitor] 10 mg PO BEDTIME 07/15/19 [History]


buPROPion HCL [Wellbutrin SR] 150 mg PO BID 07/15/19 [History]


Lactobacillus Rhamnosus GG [Culturelle] 1 cap PO BID #60 cap 19 [Rx]











Past Medical History


HEENT History: Reports: Cataract, Hard of Hearing, Impaired Vision, Macular 

Degeneration


Other HEENT History: wears glasses


Cardiovascular History: Reports: Arrhythmia, High Cholesterol, Hypertension, SOB

 on Exertion


Respiratory History: Reports: Asthma, COPD, SOB


Gastrointestinal History: Reports: GERD, Hiatal Hernia


Other Gastrointestinal History: Barretts esoph


Genitourinary History: Reports: Renal Disease, Urinary Incontinence


Other Genitourinary History: stage 3 kidney


OB/GYN History: Reports: Pregnancy


Musculoskeletal History: Reports: Arthritis, Fracture, Neck Pain, Chronic, 

Osteoarthritis


Neurological History: Reports: Headaches, Chronic


Other Neuro History: blood clot in brain (2016) resolved on it's own


Psychiatric History: Reports: Anxiety, Mood Swings


Endocrine/Metabolic History: Reports: Diabetes, Type II, Obesity/BMI 30+, 

Vitamin D Deficiency


Other Endocrine/Metabolic History: BS this am 100


Hematologic History: Reports: Anemia, Blood Transfusion(s)


Dermatologic History: Reports: Eczema





- Infectious Disease History


Infectious Disease History: Reports: Chicken Pox





- Past Surgical History


Head Surgeries/Procedures: Reports: None


HEENT Surgical History: Reports: Cataract Surgery, Eye Surgery


Cardiovascular Surgical History: Reports: None


Other Cardiovascular Surgeries/Procedures: cardioverson x2


Respiratory Surgical History: Reports: None


GI Surgical History: Reports: Colonoscopy, EGD, Esophageal Dilatation, Hernia 

Repair/Other, Nissen Fundoplication


Other GI Surgeries/Procedures: esophageal ablation.  lap nissen


Female  Surgical History: Reports:  Section


Other Female  Surgeries/Procedures: hx of kidney failure-being followed by 

nephrology


Endocrine Surgical History: Reports: None


Neurological Surgical History: Reports: None


Musculoskeletal Surgical History: Reports: Shoulder Surgery, Other (See Below)


Other Musculoskeletal Surgeries/Procedures:: R shoulder surgery


Dermatological Surgical History: Reports: None





Social & Family History





- Family History


Family Medical History: No Pertinent Family History





- Tobacco Use


Tobacco Use Status *Q: Never Tobacco User





- Caffeine Use


Caffeine Use: Reports: Soda, Tea





ED ROS GENERAL





- Review of Systems


Review Of Systems: See Below


Constitutional: Denies: Fever, Chills


Respiratory: Denies: Shortness of Breath


Cardiovascular: Denies: Chest Pain


GI/Abdominal: Reports: Abdominal Pain.  Denies: Nausea, Vomiting


Skin: Reports: Bruising (Some bruising is developing over the painful area)





ED EXAM, GENERAL





- Physical Exam


Exam: See Below


Exam Limited By: No Limitations


General Appearance: Alert, No Apparent Distress


Respiratory/Chest: No Respiratory Distress


Cardiovascular: Regular Rate, Rhythm


GI/Abdominal: Normal Bowel Sounds, Soft (The majority of the abdomen is soft but

 she does have a hematoma formed in the left lateral abdomen which is all 

subcutaneous.  It is about 4 to 5 cm across.  Some bruising is developing in the

 area.)


Neurological: Alert, Oriented


Skin Exam: Warm, Dry, Other (Skin is normal other than the bruised area over the

 left abdomen)





Course





- Vital Signs


Last Recorded V/S: 


                                Last Vital Signs











Temp  97.0 F   21 15:20


 


Pulse  92   21 15:20


 


Resp  20   21 15:20


 


BP  118/55 L  21 15:20


 


Pulse Ox  94 L  21 15:20














- Re-Assessments/Exams


Free Text/Narrative Re-Assessment/Exam: 





21 15:42


Explained to the patient she has an abdominal wall hematoma, all subcutaneous at

 this time and seems to be confined.  Cool compresses or ice for the next 24 to 

48 hours followed by warm compresses and this should resolve, if not improving 

in 3 to 4 days she can have it checked again.  If it is worsening she should 

recheck it sooner.





Departure





- Departure


Time of Disposition: 15:56


Disposition: Home, Self-Care 01


Clinical Impression: 


Hematoma of abdominal wall


Qualifiers:


 Encounter type: initial encounter Qualified Code(s): S30.1XXA - Contusion of ab

dominal wall, initial encounter








- Discharge Information


Instructions:  Contusion, Easy-to-Read


Referrals: 


Reece Pichardo MD [Primary Care Provider] - 


Forms:  ED Department Discharge


Care Plan Goals: 


Cool compresses or ice over the swollen area for the 1st day or 2 may be 

helpful.  Ibuprofen can help with pain, then moist heat after 48 hours may 

increase healing.  Recheck in 3 to 4 days if not improving satisfactorily, or 

return sooner if worsening or concerns.





Sepsis Event Note (ED)





- Evaluation


Sepsis Screening Result: No Definite Risk





- Focused Exam


Vital Signs: 


                                   Vital Signs











  Temp Pulse Resp BP Pulse Ox


 


 21 15:20  97.0 F  92  20  118/55 L  94 L

## 2021-03-25 ENCOUNTER — HOSPITAL ENCOUNTER (OUTPATIENT)
Dept: HOSPITAL 11 - JP.ED | Age: 71
Setting detail: OBSERVATION
LOS: 1 days | Discharge: HOME | End: 2021-03-26
Attending: INTERNAL MEDICINE | Admitting: INTERNAL MEDICINE
Payer: MEDICARE

## 2021-03-25 DIAGNOSIS — Z91.041: ICD-10-CM

## 2021-03-25 DIAGNOSIS — E78.00: ICD-10-CM

## 2021-03-25 DIAGNOSIS — Z79.899: ICD-10-CM
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
## 2021-03-25 DIAGNOSIS — E66.9: ICD-10-CM

## 2021-03-25 DIAGNOSIS — Z88.6: ICD-10-CM

## 2021-03-25 DIAGNOSIS — R53.1: Primary | ICD-10-CM

## 2021-03-25 DIAGNOSIS — N18.31: ICD-10-CM

## 2021-03-25 DIAGNOSIS — Z88.8: ICD-10-CM

## 2021-03-25 DIAGNOSIS — J44.9: ICD-10-CM

## 2021-03-25 DIAGNOSIS — Z98.890: ICD-10-CM
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
## 2021-03-25 DIAGNOSIS — S00.83XA: ICD-10-CM

## 2021-03-25 DIAGNOSIS — M19.90: ICD-10-CM

## 2021-03-25 DIAGNOSIS — E11.22: ICD-10-CM

## 2021-03-25 DIAGNOSIS — Z79.84: ICD-10-CM

## 2021-03-25 DIAGNOSIS — I12.9: ICD-10-CM

## 2021-03-25 DIAGNOSIS — Z79.82: ICD-10-CM

## 2021-03-25 PROCEDURE — G0378 HOSPITAL OBSERVATION PER HR: HCPCS

## 2021-03-25 RX ADMIN — TRIAMCINOLONE ACETONIDE SCH: 1 CREAM TOPICAL at 21:18

## 2021-03-25 NOTE — CRLCT
INDICATION:



Fall with trauma to right forehead 



TECHNIQUE:



CT head without contrast.



COMPARISON:



None 



FINDINGS:



CSF spaces: Within normal limits for age.  



Brain parenchyma: The gray-white differentiation is normal.  No sign of 

mass, hemorrhage, or midline shift.  



Skull base and calvarium: The visualized paranasal sinuses and mastoid air 

cells demonstrate no acute or significant findings.  The visualized orbits 

are grossly unremarkable.  No skull fractures. Right frontal scalp 

hematoma. 



IMPRESSION:



Right frontal scalp hematoma with no associated fractures or evidence of 

acute intracranial trauma.



Dictated by Reece Dao MD @ 3/25/2021 6:20:46 PM



Please note that all CT scans at this facility use dose modulation, 

iterative reconstruction, and/or weight-based dosing when appropriate to 

reduce radiation dose to as low as reasonably achievable.



Dictated by: Reece Dao MD @ 03/25/2021 18:20:50



(Electronically Signed)

## 2021-03-25 NOTE — PCM.HP.2
H&P History of Present Illness





- General


Date of Service: 21


Admit Problem/Dx: 


                           Admission Diagnosis/Problem





Admission Diagnosis/Problem      Weakness








Source of Information: Patient, Family (Daughter Kiana at bedside)


History Limitations: Reports: No Limitations





- History of Present Illness


Initial Comments - Free Text/Narative: 





chief complaint: fall at home- weakness





Weakness with fall at home.- this is a 72 year old female reports fall at home 

in her bedroom. She hit her right forehead, does not believe she had LOC, could 

not stand up due to weakness and crawled on her hands and knee to the kitchen 

where her cell phone was located. She was brought to the hospital. Had a CT of 

head and labs which did not show any acute process. Due to her not being able to

walk and her balance being impaired, she reluctantly agrees to overnight 

admission for fluids and monitoring. Also reports had her 2nd Covid vaccine and 

hasn't well since the shot.


Onset of Symptoms: Reports: Today


Symptom Onset Date: 21


Symptom Onset Time: 16:00


Duration of Symptoms: Reports: Constant


Location: Reports: Head, Other (bruising bilateral shoulder, right forearm, 

right forehead)


Quality: Reports: Ache


Severity: Mild


Improves with: Reports: Rest


Worsens with: Reports: Movement


Context: Reports: Trauma (fall in bedrooom of home)


Associated Symptoms: Reports: Headaches, Weakness





- Related Data


Allergies/Adverse Reactions: 


                                    Allergies











Allergy/AdvReac Type Severity Reaction Status Date / Time


 


codeine Allergy Intermediate Rash Verified 21 15:21


 


iodine Allergy Intermediate Rash Verified 21 15:21


 


Iodinated Contrast Media Allergy  Hives Verified 21 15:21





[Iodinated Contrast Media -     





IV Dye]     


 


isosorbide [Isosorbide] AdvReac  Cough Verified 21 15:21


 


midazolam HCl [From Versed] AdvReac  Confusion Verified 21 15:21


 


NSAIDS (Non-Steroidal AdvReac  Renal Verified 21 15:21





Anti-Inflamma   Failure  











Home Medications: 


                                    Home Meds





Albuterol Sulfate [Proair Hfa] 2 puff IH QID PRN 10/26/13 [History]


Aspirin [Adult Low Dose Aspirin EC] 81 mg PO DAILY 10/26/13 [History]


Cholecalciferol (Vitamin D3) [Vitamin D3] 1,000 units PO BID 10/26/13 [History]


Loratadine [Allergy] 10 mg PO ACBRK PRN 10/26/13 [History]


Montelukast [Singulair] 10 mg PO BEDTIME 10/26/13 [History]


metFORMIN [Glucophage] 1,000 mg PO PCBREAKFAST 10/26/13 [History]


polyethylene glycoL 3350 [MiraLAX] 17 gm PO DAILY PRN 10/26/13 [History]


traZODone 50 mg PO BEDTIME 10/26/13 [History]


Verapamil [Calan] 20 mg PO TID 04/10/14 [History]


Oxybutynin Chloride 5 mg PO TID 02/18/15 [History]


Gabapentin [Neurontin] 600 mg PO BEDTIME 08/05/15 [History]


Pantoprazole [ProTONIX***] 40 mg PO BID 10/23/15 [History]


Acetaminophen [Tylenol Arthritis] 650 mg PO Q8H PRN 11/10/16 [History]


Triamcinolone Acetonide [Triamcinolone Acetonide 0.1% Crm] 1 applic TOP TID 

18 [History]


atorvaSTATin [Lipitor] 10 mg PO BEDTIME 07/15/19 [History]


buPROPion HCL [Wellbutrin SR] 150 mg PO BID 07/15/19 [History]


Lactobacillus Rhamnosus GG [Culturelle] 1 cap PO BID #60 cap 19 [Rx]











Past Medical History


HEENT History: Reports: Cataract, Hard of Hearing, Impaired Vision, Macular 

Degeneration


Other HEENT History: wears glasses


Cardiovascular History: Reports: Arrhythmia, High Cholesterol, Hypertension, SOB

 on Exertion


Respiratory History: Reports: Asthma, COPD, SOB


Gastrointestinal History: Reports: GERD, Hiatal Hernia


Other Gastrointestinal History: Barretts esoph


Genitourinary History: Reports: Renal Disease, Urinary Incontinence


Other Genitourinary History: stage 3 kidney


OB/GYN History: Reports: Pregnancy


Musculoskeletal History: Reports: Arthritis, Fracture, Neck Pain, Chronic, 

Osteoarthritis


Neurological History: Reports: Headaches, Chronic


Other Neuro History: blood clot in brain (2016) resolved on it's own


Psychiatric History: Reports: Anxiety, Mood Swings


Endocrine/Metabolic History: Reports: Diabetes, Type II, Obesity/BMI 30+, 

Vitamin D Deficiency


Other Endocrine/Metabolic History: BS this am 100


Hematologic History: Reports: Anemia, Blood Transfusion(s)


Dermatologic History: Reports: Eczema





- Infectious Disease History


Infectious Disease History: Reports: Chicken Pox





- Past Surgical History


Head Surgeries/Procedures: Reports: None


HEENT Surgical History: Reports: Cataract Surgery, Eye Surgery


Cardiovascular Surgical History: Reports: None


Other Cardiovascular Surgeries/Procedures: cardioverson x2


Respiratory Surgical History: Reports: None


GI Surgical History: Reports: Colonoscopy, EGD, Esophageal Dilatation, Hernia 

Repair/Other, Nissen Fundoplication


Other GI Surgeries/Procedures: esophageal ablation.  lap nissen


Female  Surgical History: Reports:  Section


Other Female  Surgeries/Procedures: hx of kidney failure-being followed by 

nephrology


Endocrine Surgical History: Reports: None


Neurological Surgical History: Reports: None


Musculoskeletal Surgical History: Reports: Shoulder Surgery, Other (See Below)


Other Musculoskeletal Surgeries/Procedures:: R shoulder surgery


Dermatological Surgical History: Reports: None





Social & Family History





- Family History


Family Medical History: No Pertinent Family History





- Tobacco Use


Tobacco Use Status *Q: Never Tobacco User





- Caffeine Use


Caffeine Use: Reports: Soda, Tea





- Recreational Drug Use


Recreational Drug Use: No





- Living Situation & Occupation


Living situation: Reports: , Alone (lives alone in apartment in [place], [place]. has 2 Daughters Kiana and Dona who live close by.)


Occupation: Retired





H&P Review of Systems





- Review of Systems:


Review Of Systems: See Below


General: Reports: Weakness, Fatigue


HEENT: Reports: Glasses, Headaches


Pulmonary: Reports: Shortness of Breath (normally short of breath from COPD)


Cardiovascular: Reports: No Symptoms


Gastrointestinal: Reports: Other (hungry)


Genitourinary: Reports: No Symptoms


Musculoskeletal: Reports: Shoulder Pain (bruising to bilateral shoulders), Arm 

Pain (right arm is painful and bruised from crawling.)


Skin: Reports: Bruising (noted to shoulders, arms and legs, right forehead)


Neurological: Reports: Pre-Existing Deficit, Weakness


Hematologic/Lymphatic: Reports: No Symptoms


Immunologic: Reports: No Symptoms





Exam





- Exam


Exam: See Below





- Vital Signs


Vital Signs: 


                                Last Vital Signs











Temp  98.2 F   21 17:07


 


Pulse  86   21 17:58


 


Resp  16   21 17:58


 


BP  132/52 L  21 17:58


 


Pulse Ox  96   21 17:58











Weight: 155 lb





- Exam


General: Alert, Oriented, Cooperative


HEENT: PERRLA, EOMI, Hearing Intact, Mucosa Moist & Pink, Nares Patent, Normal 

Nasal Septum, Posterior Pharynx Clear, TMs Clear, Glasses


Neck: Supple, Trachea Midline


Lungs: Clear to Auscultation, Normal Respiratory Effort


Cardiovascular: Regular Rate, Regular Rhythm, Normal S1, Normal S2


GI/Abdominal Exam: Normal Bowel Sounds, Soft, Non-Tender, No Organomegaly, No 

Distention, No Abnormal Bruit, No Mass, Pelvis Stable


 (Female) Exam: Deferred


Rectal (Female) Exam: Deferred


Back Exam: Normal Inspection, Full Range of Motion, Other (rash noted to lower 

back, dried circular sores)


Extremities: Normal Inspection, Normal Range of Motion, Non-Tender, No Pedal 

Edema, Normal Capillary Refill


Peripheral Pulses: 2+: Radial (L), Radial (R)


Skin: Warm, Dry, Rash (rash noted to lower back. dry red sore without signs of 

infections), Other (abrasion noted to right forehead, bruising with intact skin 

to shoulders and arms.)


Neurological: Cranial Nerves Intact, Reflexes Equal Bilateral, Strength Equal 

Bilateral, Normal Speech, Normal Tone


Neuro Extensive - Mental Status: Alert, Oriented x3, Normal Mood/Affect, Normal 

Cognition, Memory Intact


Neuro Extensive - Motor, Sensory, Reflexes: Abnormal Gait (pre-existing deficits

 with ambulation, use of walker )


Psychiatric: Alert, Normal Affect, Normal Mood





- Patient Data


Lab Results Last 24 hrs: 


                         Laboratory Results - last 24 hr











  21 Range/Units





  17:18 17:45 17:46 


 


WBC   15.3 H   (4.5-11.0)  K/uL


 


RBC   3.97   (3.30-5.50)  M/uL


 


Hgb   11.9 L   (12.0-15.0)  g/dL


 


Hct   36.8   (36.0-48.0)  %


 


MCV   93   (80-98)  fL


 


MCH   30   (27-31)  pg


 


MCHC   32   (32-36)  %


 


Plt Count   360   (150-400)  K/uL


 


Neut % (Auto)   85 H   (36-66)  %


 


Lymph % (Auto)   4 L   (24-44)  %


 


Mono % (Auto)   9 H   (2-6)  %


 


Eos % (Auto)   1 L   (2-4)  %


 


Baso % (Auto)   1   (0-1)  %


 


APTT     (27.0-36.0)  sec


 


Sodium    134 L  (140-148)  mmol/L


 


Potassium    4.8  (3.6-5.2)  mmol/L


 


Chloride    97 L  (100-108)  mmol/L


 


Carbon Dioxide    27  (21-32)  mmol/L


 


Anion Gap    14.8 H  (5.0-14.0)  mmol/L


 


BUN    18  (7-18)  mg/dL


 


Creatinine    1.2 H  (0.6-1.0)  mg/dL


 


Est Cr Clr Drug Dosing    31.33  mL/min


 


Estimated GFR (MDRD)    44 L  (>60)  


 


Glucose    109 H  ()  mg/dL


 


POC Glucose  111 H    ()  MG/DL


 


Calcium    9.0  D  (8.5-10.1)  mg/dL


 


Total Bilirubin    0.3  (0.2-1.0)  mg/dL


 


AST    34  D  (15-37)  U/L


 


ALT    34  (12-78)  U/L


 


Alkaline Phosphatase    106  ()  U/L


 


Total Protein    7.4  (6.4-8.2)  g/dL


 


Albumin    2.9 L  (3.4-5.0)  g/dL


 


Globulin    4.5 H  (2.3-3.5)  g/dL


 


Albumin/Globulin Ratio    0.6 L  (1.2-2.2)  














  / Range/Units





  17:46 


 


WBC   (4.5-11.0)  K/uL


 


RBC   (3.30-5.50)  M/uL


 


Hgb   (12.0-15.0)  g/dL


 


Hct   (36.0-48.0)  %


 


MCV   (80-98)  fL


 


MCH   (27-31)  pg


 


MCHC   (32-36)  %


 


Plt Count   (150-400)  K/uL


 


Neut % (Auto)   (36-66)  %


 


Lymph % (Auto)   (24-44)  %


 


Mono % (Auto)   (2-6)  %


 


Eos % (Auto)   (2-4)  %


 


Baso % (Auto)   (0-1)  %


 


APTT  28.0  (27.0-36.0)  sec


 


Sodium   (140-148)  mmol/L


 


Potassium   (3.6-5.2)  mmol/L


 


Chloride   (100-108)  mmol/L


 


Carbon Dioxide   (21-32)  mmol/L


 


Anion Gap   (5.0-14.0)  mmol/L


 


BUN   (7-18)  mg/dL


 


Creatinine   (0.6-1.0)  mg/dL


 


Est Cr Clr Drug Dosing   mL/min


 


Estimated GFR (MDRD)   (>60)  


 


Glucose   ()  mg/dL


 


POC Glucose   ()  MG/DL


 


Calcium   (8.5-10.1)  mg/dL


 


Total Bilirubin   (0.2-1.0)  mg/dL


 


AST   (15-37)  U/L


 


ALT   (12-78)  U/L


 


Alkaline Phosphatase   ()  U/L


 


Total Protein   (6.4-8.2)  g/dL


 


Albumin   (3.4-5.0)  g/dL


 


Globulin   (2.3-3.5)  g/dL


 


Albumin/Globulin Ratio   (1.2-2.2)  











Result Diagrams: 


                                 21 17:45





                                 21 17:46





Sepsis Event Note





- Evaluation


Sepsis Screening Result: No Definite Risk





- Focused Exam


Vital Signs: 


                                   Vital Signs











  Temp Pulse Resp BP Pulse Ox


 


 21 17:58   86  16  132/52 L  96


 


 21 17:07  98.2 F  89  16  142/56 H  94 L


 


 21 17:03  98.2 F  89  16  142/56 H  94 L














- Problem List


(1) Weakness


SNOMED Code(s): 59461311


   ICD Code: R53.1 - WEAKNESS   Status: Acute   Priority: High   Current Visit: 

Yes   





(2) CKD (chronic kidney disease) stage 3, GFR 30-59 ml/min


SNOMED Code(s): 858990287


   ICD Code: N18.3 - CHRONIC KIDNEY DISEASE, STAGE 3 (MODERATE) * DO NOT USE *  

 Status: Chronic   Priority: Low   Current Visit: No   





(3) COPD (chronic obstructive pulmonary disease)


SNOMED Code(s): 17869899


   ICD Code: J44.9 - CHRONIC OBSTRUCTIVE PULMONARY DISEASE, UNSPECIFIED   Stat

us: Chronic   Priority: Low   Current Visit: No   





(4) Degenerative arthritis


SNOMED Code(s): 743136777


   ICD Code: M19.90 - UNSPECIFIED OSTEOARTHRITIS, UNSPECIFIED SITE   Status: 

Chronic   Priority: Low   Current Visit: No   


Qualifiers: 


   Osteoarthritis location: unspecified site 





(5) Type 2 diabetes mellitus


SNOMED Code(s): 24421775


   ICD Code: E11.9 - TYPE 2 DIABETES MELLITUS WITHOUT COMPLICATIONS   Status: 

Chronic   Priority: Low   Current Visit: No   


Qualifiers: 


   Diabetes mellitus long term insulin use: without long term use   Diabetes 

mellitus complication status: with diabetic arthropathy 


Problem List Initiated/Reviewed/Updated: Yes


Orders Last 24hrs: 


                               Active Orders 24 hr











 Category Date Time Status


 


 Patient Status Manage Transfer [TRANSFER] Routine ADT  21 18:55 Ordered


 


 Cardiac Monitoring [RC] .As Directed Care  21 17:19 Active


 


 EKG Documentation Completion [RC] ASDIRECTED Care  21 18:03 Active


 


 UA W/MICROSCOPIC [URIN] Urgent Lab  21 18:45 Received


 


 Sodium Chloride 0.9% [Normal Saline] 1,000 ml Med  21 17:30 Active





 IV ASDIRECTED   


 


 Resuscitation Status Routine Resus Stat  21 18:57 Ordered


 


 EKG 12 Lead [EK] Routine Ther  21 18:03 Ordered








                                Medication Orders





Sodium Chloride (Normal Saline)  1,000 mls @ 300 mls/hr IV ASDIRECTED MEGHAN


   Last Admin: 21 17:48  Dose: 300 mls/hr


   Documented by: [email or web address]








Assessment/Plan Comment:: 





Assessment/Plan Comment:: WEAKNESS





ASSESSMENT AND PLAN





Weakness with fall at home.- this is a 72 year old female reports fall at home 

in her bedroom. She hit her right forehead, does not believe she had LOC, could 

not stand up due to weakness and crawled on her hands and knee to the kitchen 

where her cell phone was located. She was brought to the hospital. Had a CT of 

head and labs which did not show any acute process. Due to her not being able to

 walk and her balance being impaired, she reluctantly agrees to overnight 

admission for fluids and monitoring. Also reports had her 2nd Covid vaccine and 

hasn't well since the shot.





-IV Normal Saline 100ml/hr


-neuro check every shift


-telemetry


-consult to PT


-consult to OT


-repeat labs in am - CBC, BMP





Degenerative Arthritis


-ambulates with walker at home


-high risk for falls


-continue outpatient medications





COPD


-Supplemental oxygen as needed


-Nebulized albuterol and Duo Nebs prn





Diabetes Type 2 - declines insulin therapy, will take Metformin and diet 

control. does monitor blood glucose usually about 160-170


-consistent carb diet


-Blood glucose testing before meals and at bedtime





Kidney disease stage 3- current creatinine is at baseline at 1.2 GFR 44 - labs 

reviewed since 2019 creatinine between 1.0 to 1.3


-gentle rehydration


-I & O


-Repeat labs in the morning





MAINTENANCE ISSUES


-DVT prophylaxis; SCD


-GI prophylaxis; continue outpatient PPI


-Espinoza catheter; not indicated


-Nutrition; consistent carb diet


-Nicotine dependence; not required


-consult to OT and PT





CODE STATUS-FULL CODE





ADMISSION STATUS-this patient will be admitted to observation status, expect no 

more than a one night hospital stay for evaluation and management of problems as

 outlined above.





DISPOSITION-anticipate discharge to home after the hospital stay.





PRIMARY CARE PROVIDER-Dr. Pichardo





HOSPITALIST Dr. Rodríguez





- Mortality Measure


Prognosis:: Good











- Mortality Measure


Prognosis:: Good

## 2021-03-26 VITALS — HEART RATE: 73 BPM | DIASTOLIC BLOOD PRESSURE: 47 MMHG | SYSTOLIC BLOOD PRESSURE: 112 MMHG

## 2021-03-26 RX ADMIN — TRIAMCINOLONE ACETONIDE SCH: 1 CREAM TOPICAL at 08:59

## 2021-03-26 NOTE — [id number].[id number]
**Discharge Summary





- Hospital Course


Brief History: 70-year-old female with history of type 2 diabetes mellitus, 

generalized osteoarthritis, COPD who presented with weakness after falling in 

her bedroom and hitting her head.  She is admitted for observation with 

weakness.


Diagnosis: Stroke: No





- Discharge Data


Discharge Date: 03/26/21


Discharge Disposition: Home, Self-Care 01


Condition: Good





- Referral to Home Health


Primary Care Physician: 


Reece Pichardo MD








- Discharge Diagnosis/Problem(s)


(1) Fall from standing


SNOMED Code(s): 1730273


   ICD Code: W19.XXXA - UNSPECIFIED FALL, INITIAL ENCOUNTER   Status: Acute   

Current Visit: Yes   


Qualifiers: 


   Encounter type: initial encounter   Qualified Code(s): W19.XXXA - Unspecified

fall, initial encounter   





(2) Weakness


SNOMED Code(s): 41440340


   ICD Code: R53.1 - WEAKNESS   Status: Acute   Priority: High   Current Visit: 

Yes   





(3) Degenerative arthritis


SNOMED Code(s): 210468111


   ICD Code: M19.90 - UNSPECIFIED OSTEOARTHRITIS, UNSPECIFIED SITE   Status: 

Chronic   Priority: Low   Current Visit: No   


Qualifiers: 


   Osteoarthritis location: unspecified site 





(4) Type 2 diabetes mellitus


SNOMED Code(s): 11735108


   ICD Code: E11.9 - TYPE 2 DIABETES MELLITUS WITHOUT COMPLICATIONS   Status: 

Chronic   Priority: Low   Current Visit: No   


Qualifiers: 


   Diabetes mellitus long term insulin use: without long term use   Diabetes 

mellitus complication status: with diabetic arthropathy 





(5) CKD (chronic kidney disease) stage 3, GFR 30-59 ml/min


SNOMED Code(s): 461378825


   ICD Code: N18.3 - CHRONIC KIDNEY DISEASE, STAGE 3 (MODERATE) * DO NOT USE *  

Status: Chronic   Priority: Low   Current Visit: No   


Qualifiers: 


   Chronic kidney disease stage 3 subtype: stage 3a (GFR 45-59)   Qualified 

Code(s): N18.31 - Chronic kidney disease, stage 3a   





- Patient Summary/Data


Consults: 


                                  Consultations





03/25/21 19:25


OT Evaluation and Treatment [CONS] Routine 


   Please Evaluate and Treat.


   OT Reason for Consult: Discharge Planning


   This query below is only for informational purposes and is not editable.


PT Evaluation and Treatment [CONS] Routine 


   Please Evaluate and Treat.


   PT Reason for Consult: Ambulation


   This query below is only for informational purposes and is not editable.











Hospital Course: 





Sabrina presented to the emergency room with weakness after falling and hitting 

her head at home.  Work-up in the emergency room was reassuring with normal 

laboratory studies and a normal head CT.  There was some evidence for mild 

dehydration.  She was too weak to stand up so she was admitted to the hospital 

for observation and some therapy.  She received some gentle IV fluids overnight.

  There were no acute issues overnight.  The morning after admission she is 

feeling much better.  She has able to get up out of bed and out of the chair and

 ambulate effectively with her walker.  She feels pretty much back to her usual 

self and is hoping to go home.  Her vital signs have all been stable.  There is 

no evidence for infection.  I encouraged her to drink plenty of fluids to 

maintain hydration.  She can follow-up as needed.





- Patient Instructions


Diet: Diabetic Diet


Activity: As Tolerated


Activity, Other: take it easy for the next couple of days


Showering/Bathing: May Shower


Notify Provider of: Fever, Increased Pain, Nausea and/or Vomiting


Other/Special Instructions: 1. You were in the hospital for observation after a 

fall with head trauma, dizziness and weakness.  Your condition has improved o

vernight with gentle IV fluid hydration.  I suspect mild dehydration led to the 

dizziness and fall.  Please drink plenty of water each day to maintain adequate 

hydration.  Take it easy for the next couple of days before returning to normal 

activities.  Follow-up with your primary care provider if symptoms do not 

continue to get better or if they get worse.  2. Continue your usual home 

medications as previously prescribed.





- Discharge Plan


*PRESCRIPTION DRUG MONITORING PROGRAM REVIEWED*: Not Applicable


*COPY OF PRESCRIPTION DRUG MONITORING REPORT IN PATIENT PILLO: Not Applicable


Home Medications: 


                                    Home Meds





Albuterol Sulfate [Proair Hfa] 2 puff IH QID PRN 10/26/13 [History]


Aspirin [Adult Low Dose Aspirin EC] 81 mg PO DAILY 10/26/13 [History]


Cholecalciferol (Vitamin D3) [Vitamin D3] 1,000 units PO BID 10/26/13 [History]


Loratadine [Allergy] 10 mg PO ACBRK PRN 10/26/13 [History]


Montelukast [Singulair] 10 mg PO BEDTIME 10/26/13 [History]


metFORMIN [Glucophage] 1,000 mg PO PCBREAKFAST 10/26/13 [History]


polyethylene glycoL 3350 [MiraLAX] 17 gm PO DAILY PRN 10/26/13 [History]


traZODone 50 mg PO BEDTIME 10/26/13 [History]


Verapamil [Calan] 20 mg PO TID 04/10/14 [History]


Oxybutynin Chloride 5 mg PO TID 02/18/15 [History]


Gabapentin [Neurontin] 600 mg PO BEDTIME 08/05/15 [History]


Pantoprazole [ProTONIX***] 40 mg PO BID 10/23/15 [History]


Acetaminophen [Tylenol Arthritis] 650 mg PO Q8H PRN 11/10/16 [History]


Triamcinolone Acetonide [Triamcinolone Acetonide 0.1% Crm] 1 applic TOP TID 

06/28/18 [History]


atorvaSTATin [Lipitor] 10 mg PO BEDTIME 07/15/19 [History]


buPROPion HCL [Wellbutrin SR] 150 mg PO BID 07/15/19 [History]


Lactobacillus Rhamnosus GG [Culturelle] 1 cap PO BID #60 cap 12/12/19 [Rx]








Patient Handouts:  Dizziness, Easy-to-Read


Referrals: 


Reece Pichardo MD [Primary Care Provider] -  (f/u as needed after the hospital 

stay )





- Discharge Summary/Plan Comment


DC Time >30 min.: No





- Patient Data


Vitals - Most Recent: 


                                Last Vital Signs











Temp  36.3 C   03/26/21 03:00


 


Pulse  73   03/26/21 03:00


 


Resp  14   03/26/21 07:00


 


BP  112/47 L  03/26/21 07:00


 


Pulse Ox  98   03/26/21 07:00











Weight - Most Recent: 72.167 kg


I&O - Last 24 hours: 


                                 Intake & Output











 03/25/21 03/26/21 03/26/21





 22:59 06:59 14:59


 


Intake Total 240 1190 


 


Output Total  1000 


 


Balance 240 190 











Lab Results - Last 24 hrs: 


                         Laboratory Results - last 24 hr











  03/25/21 03/25/21 03/25/21 Range/Units





  17:18 17:45 17:46 


 


WBC   15.3 H   (4.5-11.0)  K/uL


 


RBC   3.97   (3.30-5.50)  M/uL


 


Hgb   11.9 L   (12.0-15.0)  g/dL


 


Hct   36.8   (36.0-48.0)  %


 


MCV   93   (80-98)  fL


 


MCH   30   (27-31)  pg


 


MCHC   32   (32-36)  %


 


Plt Count   360   (150-400)  K/uL


 


Neut % (Auto)   85 H   (36-66)  %


 


Lymph % (Auto)   4 L   (24-44)  %


 


Mono % (Auto)   9 H   (2-6)  %


 


Eos % (Auto)   1 L   (2-4)  %


 


Baso % (Auto)   1   (0-1)  %


 


APTT     (27.0-36.0)  sec


 


Sodium    134 L  (140-148)  mmol/L


 


Potassium    4.8  (3.6-5.2)  mmol/L


 


Chloride    97 L  (100-108)  mmol/L


 


Carbon Dioxide    27  (21-32)  mmol/L


 


Anion Gap    14.8 H  (5.0-14.0)  mmol/L


 


BUN    18  (7-18)  mg/dL


 


Creatinine    1.2 H  (0.6-1.0)  mg/dL


 


Est Cr Clr Drug Dosing    31.33  mL/min


 


Estimated GFR (MDRD)    44 L  (>60)  


 


Glucose    109 H  ()  mg/dL


 


POC Glucose  111 H    ()  MG/DL


 


Calcium    9.0  D  (8.5-10.1)  mg/dL


 


Total Bilirubin    0.3  (0.2-1.0)  mg/dL


 


AST    34  D  (15-37)  U/L


 


ALT    34  (12-78)  U/L


 


Alkaline Phosphatase    106  ()  U/L


 


Total Protein    7.4  (6.4-8.2)  g/dL


 


Albumin    2.9 L  (3.4-5.0)  g/dL


 


Globulin    4.5 H  (2.3-3.5)  g/dL


 


Albumin/Globulin Ratio    0.6 L  (1.2-2.2)  


 


Urine Color     (YELLOW)  


 


Urine Appearance     (CLEAR)  


 


Urine pH     (5.0-8.0)  


 


Ur Specific Gravity     (1.008-1.030)  


 


Urine Protein     (NEGATIVE)  mg/dL


 


Urine Glucose (UA)     (NEGATIVE)  mg/dL


 


Urine Ketones     (NEGATIVE)  mg/dL


 


Urine Occult Blood     (NEGATIVE)  


 


Urine Nitrite     (NEGATIVE)  


 


Urine Bilirubin     (NEGATIVE)  


 


Urine Urobilinogen     (0.2-1.0)  EU/dL


 


Ur Leukocyte Esterase     (NEGATIVE)  


 


Urine RBC     (0-5)  


 


Urine WBC     (0-5)  


 


Ur Epithelial Cells     


 


Amorphous Sediment     


 


Urine Bacteria     


 


Urine Mucus     














  03/25/21 03/25/21 03/26/21 Range/Units





  17:46 18:45 05:55 


 


WBC    9.0  (4.5-11.0)  K/uL


 


RBC    3.67  (3.30-5.50)  M/uL


 


Hgb    11.1 L  (12.0-15.0)  g/dL


 


Hct    34.3 L  (36.0-48.0)  %


 


MCV    94  (80-98)  fL


 


MCH    30  (27-31)  pg


 


MCHC    32  (32-36)  %


 


Plt Count    341  (150-400)  K/uL


 


Neut % (Auto)    70 H  (36-66)  %


 


Lymph % (Auto)    10 L  (24-44)  %


 


Mono % (Auto)    15 H  (2-6)  %


 


Eos % (Auto)    5 H  (2-4)  %


 


Baso % (Auto)    1  (0-1)  %


 


APTT  28.0    (27.0-36.0)  sec


 


Sodium     (140-148)  mmol/L


 


Potassium     (3.6-5.2)  mmol/L


 


Chloride     (100-108)  mmol/L


 


Carbon Dioxide     (21-32)  mmol/L


 


Anion Gap     (5.0-14.0)  mmol/L


 


BUN     (7-18)  mg/dL


 


Creatinine     (0.6-1.0)  mg/dL


 


Est Cr Clr Drug Dosing     mL/min


 


Estimated GFR (MDRD)     (>60)  


 


Glucose     ()  mg/dL


 


POC Glucose     ()  MG/DL


 


Calcium     (8.5-10.1)  mg/dL


 


Total Bilirubin     (0.2-1.0)  mg/dL


 


AST     (15-37)  U/L


 


ALT     (12-78)  U/L


 


Alkaline Phosphatase     ()  U/L


 


Total Protein     (6.4-8.2)  g/dL


 


Albumin     (3.4-5.0)  g/dL


 


Globulin     (2.3-3.5)  g/dL


 


Albumin/Globulin Ratio     (1.2-2.2)  


 


Urine Color   Yellow   (YELLOW)  


 


Urine Appearance   Slightly cloudy A   (CLEAR)  


 


Urine pH   6.0   (5.0-8.0)  


 


Ur Specific Gravity   1.020   (1.008-1.030)  


 


Urine Protein   30 H   (NEGATIVE)  mg/dL


 


Urine Glucose (UA)   Negative   (NEGATIVE)  mg/dL


 


Urine Ketones   Negative   (NEGATIVE)  mg/dL


 


Urine Occult Blood   Moderate H   (NEGATIVE)  


 


Urine Nitrite   Negative   (NEGATIVE)  


 


Urine Bilirubin   Negative   (NEGATIVE)  


 


Urine Urobilinogen   0.2   (0.2-1.0)  EU/dL


 


Ur Leukocyte Esterase   Trace H   (NEGATIVE)  


 


Urine RBC   5-10 H   (0-5)  


 


Urine WBC   0-5   (0-5)  


 


Ur Epithelial Cells   Moderate   


 


Amorphous Sediment   Few   


 


Urine Bacteria   Few   


 


Urine Mucus   Few   














  03/26/21 Range/Units





  05:55 


 


WBC   (4.5-11.0)  K/uL


 


RBC   (3.30-5.50)  M/uL


 


Hgb   (12.0-15.0)  g/dL


 


Hct   (36.0-48.0)  %


 


MCV   (80-98)  fL


 


MCH   (27-31)  pg


 


MCHC   (32-36)  %


 


Plt Count   (150-400)  K/uL


 


Neut % (Auto)   (36-66)  %


 


Lymph % (Auto)   (24-44)  %


 


Mono % (Auto)   (2-6)  %


 


Eos % (Auto)   (2-4)  %


 


Baso % (Auto)   (0-1)  %


 


APTT   (27.0-36.0)  sec


 


Sodium  140  (140-148)  mmol/L


 


Potassium  4.0  (3.6-5.2)  mmol/L


 


Chloride  105  (100-108)  mmol/L


 


Carbon Dioxide  25  (21-32)  mmol/L


 


Anion Gap  9.9  (5.0-14.0)  mmol/L


 


BUN  16  (7-18)  mg/dL


 


Creatinine  1.0  (0.6-1.0)  mg/dL


 


Est Cr Clr Drug Dosing  37.60  mL/min


 


Estimated GFR (MDRD)  55 L  (>60)  


 


Glucose  137 H  ()  mg/dL


 


POC Glucose   ()  MG/DL


 


Calcium  8.2 L  (8.5-10.1)  mg/dL


 


Total Bilirubin   (0.2-1.0)  mg/dL


 


AST   (15-37)  U/L


 


ALT   (12-78)  U/L


 


Alkaline Phosphatase   ()  U/L


 


Total Protein   (6.4-8.2)  g/dL


 


Albumin   (3.4-5.0)  g/dL


 


Globulin   (2.3-3.5)  g/dL


 


Albumin/Globulin Ratio   (1.2-2.2)  


 


Urine Color   (YELLOW)  


 


Urine Appearance   (CLEAR)  


 


Urine pH   (5.0-8.0)  


 


Ur Specific Gravity   (1.008-1.030)  


 


Urine Protein   (NEGATIVE)  mg/dL


 


Urine Glucose (UA)   (NEGATIVE)  mg/dL


 


Urine Ketones   (NEGATIVE)  mg/dL


 


Urine Occult Blood   (NEGATIVE)  


 


Urine Nitrite   (NEGATIVE)  


 


Urine Bilirubin   (NEGATIVE)  


 


Urine Urobilinogen   (0.2-1.0)  EU/dL


 


Ur Leukocyte Esterase   (NEGATIVE)  


 


Urine RBC   (0-5)  


 


Urine WBC   (0-5)  


 


Ur Epithelial Cells   


 


Amorphous Sediment   


 


Urine Bacteria   


 


Urine Mucus   











Med Orders - Current: 


                               Current Medications





Acetaminophen (Acetaminophen 325 Mg Tab)  650 mg PO Q4H PRN


   PRN Reason: Pain (Mild 1-3)/fever


   Last Admin: 03/26/21 05:51 Dose:  650 mg


   Documented by: 


Albuterol (Albuterol 0.083% 2.5 Mg/3 Ml Neb Soln)  2.5 mg NEB Q4H PRN


   PRN Reason: Shortness Of Breath/wheezing


Albuterol/Ipratropium (Albuterol/Ipratropium 3.0-0.5 Mg/3 Ml Neb Soln)  3 ml NEB

QID PRN


   PRN Reason: Shortness Of Breath/wheezing


Bisacodyl (Bisacodyl 5 Mg Tab)  5 mg PO DAILY PRN


   PRN Reason: Constipation


Bupropion HCl (Bupropion 150 Mg Tab.Sr (Ptom))  150 mg PO BID [facility]


   Last Admin: 03/26/21 08:59 Dose:  150 mg


   Documented by: 


Docusate Sodium (Docusate Sodium 100 Mg Cap)  100 mg PO BID PRN


   PRN Reason: Constipation


Gabapentin (Gabapentin 300 Mg Cap (Ptom))  600 mg PO BEDTIME [facility]


Sodium Chloride (Normal Saline)  1,000 mls @ 100 mls/hr IV ASDIRECTED [facility]


   Last Admin: 03/26/21 05:02 Dose:  100 mls/hr


   Documented by: 


Montelukast Sodium (Montelukast 10 Mg Tab (Ptom))  10 mg PO BEDTIME [facility]


Ondansetron HCl (Ondansetron 4 Mg Tab.Dis)  4 mg PO Q6H PRN


   PRN Reason: Nausea able to take PO


Ondansetron HCl (Ondansetron 4 Mg/2 Ml Sdv)  4 mg IV Q4H PRN


   PRN Reason: Nausea/Vomiting


Oxycodone HCl (Oxycodone 5 Mg Tab)  5 mg PO Q4H PRN


   PRN Reason: Pain (moderate 4-6)


Pantoprazole Sodium (Pantoprazole 40 Mg Tab.Cr (Ptom))  40 mg PO BIDAC [facility]


   Last Admin: 03/26/21 08:57 Dose:  40 mg


   Documented by: 


Verapmil 40mg Tab ( (Ptom))  0 each PO TID [facility]


   Last Admin: 03/26/21 08:58 Dose:  1 each


   Documented by: 


Trazodone HCl (Trazodone 50 Mg Tab (Ptom))  50 mg PO BEDTIME [facility]


Triamcinolone Acetonide (Triamcinolone Acetonide 0.1% Crm 15 Gm Tube)  0 gm TOP 

TID [facility]


   Last Admin: 03/26/21 08:59 Dose:  Not Given


   Documented by: 





Discontinued Medications





Bupropion HCl (Bupropion 150 Mg Tab.Sr)  150 mg PO BID [facility]


   Last Admin: 03/25/21 21:16 Dose:  150 mg


   Documented by: 


Gabapentin (Gabapentin 300 Mg Cap)  600 mg PO BEDTIME [facility]


   Last Admin: 03/25/21 21:22 Dose:  600 mg


   Documented by: 


Sodium Chloride (Normal Saline)  1,000 mls @ 300 mls/hr IV ASDIRECTED [facility]


   Last Admin: 03/25/21 17:48 Dose:  300 mls/hr


   Documented by: 


Montelukast Sodium (Montelukast 10 Mg Tab)  10 mg PO BEDTIME [facility]


   Last Admin: 03/25/21 21:17 Dose:  10 mg


   Documented by: 


Pantoprazole Sodium (Pantoprazole 40 Mg Tab.Cr)  40 mg PO BID [facility]


   Last Admin: 03/25/21 21:17 Dose:  40 mg


   Documented by: 


Trazodone HCl (Trazodone 50 Mg Tab)  50 mg PO BEDTIME [facility]


   Last Admin: 03/25/21 21:16 Dose:  50 mg


   Documented by: 


Verapamil HCl (Verapamil 80 Mg Tab)  20 mg PO TID [facility]


   Last Admin: 03/25/21 21:15 Dose:  20 mg


   Documented by:

## 2021-04-16 ENCOUNTER — HOSPITAL ENCOUNTER (EMERGENCY)
Dept: HOSPITAL 11 - JP.ED | Age: 71
Discharge: HOME | End: 2021-04-16
Payer: MEDICARE

## 2021-04-16 VITALS — DIASTOLIC BLOOD PRESSURE: 68 MMHG | HEART RATE: 78 BPM | SYSTOLIC BLOOD PRESSURE: 146 MMHG

## 2021-04-16 DIAGNOSIS — Z91.041: ICD-10-CM

## 2021-04-16 DIAGNOSIS — Z79.899: ICD-10-CM

## 2021-04-16 DIAGNOSIS — J44.9: ICD-10-CM

## 2021-04-16 DIAGNOSIS — I12.9: ICD-10-CM

## 2021-04-16 DIAGNOSIS — E78.00: ICD-10-CM

## 2021-04-16 DIAGNOSIS — Z88.4: ICD-10-CM

## 2021-04-16 DIAGNOSIS — S30.1XXA: Primary | ICD-10-CM

## 2021-04-16 DIAGNOSIS — Z79.82: ICD-10-CM

## 2021-04-16 DIAGNOSIS — Z91.048: ICD-10-CM

## 2021-04-16 DIAGNOSIS — Z79.84: ICD-10-CM
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
## 2021-04-16 DIAGNOSIS — Z88.8: ICD-10-CM

## 2021-04-16 DIAGNOSIS — E11.22: ICD-10-CM

## 2021-04-16 DIAGNOSIS — M19.90: ICD-10-CM

## 2021-04-16 DIAGNOSIS — K21.9: ICD-10-CM

## 2021-04-16 DIAGNOSIS — N18.30: ICD-10-CM

## 2021-04-16 DIAGNOSIS — Z88.5: ICD-10-CM

## 2021-04-16 NOTE — CRLCT
INDICATION:



Left lower quadrant abdominal pain. 



COMPARISON:



None available 



TECHNIQUE:



CT examination of the abdomen and pelvis was performed without contrast 

enhancement using 3 mm thick axial sections from the lung bases through the 

pubic symphysis. Oral contrast was not administered. 



Please note that all CT scans at this facility use dose modulation, 

iterative reconstruction, and/or weight-based dosing when appropriate to 

reduce radiation dose to as low as reasonably achievable. 



FINDINGS:



In the abdomen, the unenhanced liver, spleen, pancreas, and adrenals are 

normal in appearance. 



Surgical clips are seen posterior to the gastric fundus and anterior to the 

pancreatic tail, of uncertain significance. 



The unenhanced kidneys are normal in appearance.



There is minimal cholelithiasis, with a few tiny dependent calculi in the 

gallbladder. There is no sign of acute cholecystitis, with no sign of 

gallbladder wall thickening or pericholecystic fluid. 



The abdominal aorta is normal in caliber with no sign of dilatation. There 

is no sign of retroperitoneal mass or adenopathy. 



There is a small hiatal hernia. The rest of the stomach, loops of small 

bowel, and colon in the abdomen are otherwise normal in appearance. 



There is a lenticular fluid collection located in the left lower anterior 

abdominal wall subcutaneous fat measuring 3.7 x 2.2 x 3.0 centimeters, 

possibly from subcutaneous injection. 



In the pelvis, the appendix is nonvisualized, but there is no sign of an 

inflammatory process in the area of the appendix.



The loops of small bowel and colon in the pelvis are normal in appearance.



The uterus and adnexal regions are normal in appearance. 



The urinary bladder is normal in appearance. 



There is no sign of pelvic or inguinal mass or adenopathy. 



There is no sign of free air or free fluid in the abdomen or pelvis. 



There is mild patchy density in the lateral left lung base related to mild 

eventration of the left hemidiaphragm. The lung bases are otherwise clear.



Moderate hypertrophic changes are scattered throughout the inferior 

thoracic and lumbar spine consistent with patient`s age.



IMPRESSION:



Nothing seen to explain the patient`s left lower quadrant pain. No sign of 

diverticulosis or diverticulitis. No sign of any abnormality of the left 

urinary system or of the left adnexal region. 



CT of the abdomen shows minimal cholelithiasis with no sign of acute 

cholecystitis.



Small hiatal hernia.



Lenticular fluid collection located in the anterior left inferior abdominal 

wall measuring up to 3.7 centimeters in diameter, possibly from 

subcutaneous injection. 



Normal CT of the pelvis without contrast.



Please note that all CT scans at this facility use dose modulation, 

iterative reconstruction, and/or weight-based dosing when appropriate to 

reduce radiation dose to as low as reasonably achievable.



Dictated by Seth Arrington MD @ Apr 16 2021  5:25AM



Signed by Dr. Seth Arrington @ Apr 16 2021  5:32AM

## 2021-04-16 NOTE — EDM.PDOC
ED HPI GENERAL MEDICAL PROBLEM





- General


Chief Complaint: General


Stated Complaint: MEDICAL VIA NORTH


Time Seen by Provider: 21 04:05


Source of Information: Reports: Patient, EMS


History Limitations: Reports: No Limitations





- History of Present Illness


INITIAL COMMENTS - FREE TEXT/NARRATIVE: 





70-year-old female that has been falling more frequently, fell 3 weeks ago onto 

her left side.  Since that time she has been having some intermittent pain in 

her left abdomen and flank area, she has hydrocodone for pain control.  She has 

difficulty laying on her left side.  Tonight she did not want to take her pain 

medicine, I am unsure why.  She was having some pain in her left side and 

decided to call the ambulance to come in and "get a picture".  Its not hurting 

worse than usual, no fevers or chills, no nausea or vomiting, I really could not

get her to explain why she came in tonight 3 weeks after falling.  She has not 

been seen for this fall.  When EMS arrived to the house she walked to the door 

and walked to the ambulance.  On arrival she got off the EMS cart, walked to the

hospital bed and got up onto the bed without much difficulty.


Onset: Other


Duration: Week(s): (3 weeks ago)


Location: Reports: Abdomen (Left abdomen)


Associated Symptoms: Reports: Weakness (Weakness is chronic, seems to be getting

somewhat worse).  Denies: Chest Pain, Cough, Loss of Appetite, Nausea/Vomiting, 

Shortness of Breath


  ** left flank


Pain Score (Numeric/FACES): 10





- Related Data


                                    Allergies











Allergy/AdvReac Type Severity Reaction Status Date / Time


 


codeine Allergy Intermediate Rash Verified 21 03:49


 


iodine Allergy Intermediate Rash Verified 21 03:49


 


Iodinated Contrast Media Allergy  Hives Verified 21 03:49





[Iodinated Contrast Media -     





IV Dye]     


 


isosorbide [Isosorbide] AdvReac  Cough Verified 21 03:49


 


midazolam HCl [From Versed] AdvReac  Confusion Verified 21 03:49


 


NSAIDS (Non-Steroidal AdvReac  Renal Verified 21 03:49





Anti-Inflamma   Failure  











Home Meds: 


                                    Home Meds





Albuterol Sulfate [Proair Hfa] 2 puff IH QID PRN 10/26/13 [History]


Aspirin [Adult Low Dose Aspirin EC] 81 mg PO DAILY 10/26/13 [History]


Cholecalciferol (Vitamin D3) [Vitamin D3] 1,000 units PO BID 10/26/13 [History]


Loratadine [Allergy] 10 mg PO ACBRK PRN 10/26/13 [History]


Montelukast [Singulair] 10 mg PO BEDTIME 10/26/13 [History]


metFORMIN [Glucophage] 1,000 mg PO PCBREAKFAST 10/26/13 [History]


polyethylene glycoL 3350 [MiraLAX] 17 gm PO DAILY PRN 10/26/13 [History]


traZODone 50 mg PO BEDTIME 10/26/13 [History]


Verapamil [Calan] 20 mg PO TID 04/10/14 [History]


Oxybutynin Chloride 5 mg PO TID 02/18/15 [History]


Gabapentin [Neurontin] 600 mg PO BEDTIME 08/05/15 [History]


Pantoprazole [ProTONIX***] 40 mg PO BID 10/23/15 [History]


Acetaminophen [Tylenol Arthritis] 650 mg PO Q8H PRN 11/10/16 [History]


Triamcinolone Acetonide [Triamcinolone Acetonide 0.1% Crm] 1 applic TOP TID 

18 [History]


atorvaSTATin [Lipitor] 10 mg PO BEDTIME 07/15/19 [History]


buPROPion HCL [Wellbutrin SR] 150 mg PO BID 07/15/19 [History]


Lactobacillus Rhamnosus GG [Culturelle] 1 cap PO BID #60 cap 19 [Rx]


Acetaminophen/HYDROcodone [Norco 325-5 MG] 1 tab PO ASDIRECTED PRN 21 

[History]











Past Medical History


HEENT History: Reports: Cataract, Hard of Hearing, Impaired Vision, Macular 

Degeneration


Other HEENT History: wears glasses


Cardiovascular History: Reports: Arrhythmia, High Cholesterol, Hypertension, SOB

 on Exertion


Respiratory History: Reports: Asthma, COPD, SOB


Gastrointestinal History: Reports: GERD, Hiatal Hernia


Other Gastrointestinal History: Barretts esoph


Genitourinary History: Reports: Renal Disease, Urinary Incontinence


Other Genitourinary History: stage 3 kidney


OB/GYN History: Reports: Pregnancy


Musculoskeletal History: Reports: Arthritis, Fracture, Neck Pain, Chronic, 

Osteoarthritis


Neurological History: Reports: Headaches, Chronic


Other Neuro History: blood clot in brain (2016) resolved on it's own


Psychiatric History: Reports: Anxiety, Mood Swings


Endocrine/Metabolic History: Reports: Diabetes, Type II, Obesity/BMI 30+, 

Vitamin D Deficiency


Other Endocrine/Metabolic History: BS this am 100


Hematologic History: Reports: Anemia, Blood Transfusion(s)


Dermatologic History: Reports: Eczema





- Infectious Disease History


Infectious Disease History: Reports: Chicken Pox





- Past Surgical History


Head Surgeries/Procedures: Reports: None


HEENT Surgical History: Reports: Cataract Surgery, Eye Surgery


Cardiovascular Surgical History: Reports: None


Other Cardiovascular Surgeries/Procedures: cardioverson x2


Respiratory Surgical History: Reports: None


GI Surgical History: Reports: Colonoscopy, EGD, Esophageal Dilatation, Hernia 

Repair/Other, Nissen Fundoplication


Other GI Surgeries/Procedures: esophageal ablation.  lap nissen


Female  Surgical History: Reports:  Section


Other Female  Surgeries/Procedures: hx of kidney failure-being followed by 

nephrology


Endocrine Surgical History: Reports: None


Neurological Surgical History: Reports: None


Musculoskeletal Surgical History: Reports: Shoulder Surgery, Other (See Below)


Other Musculoskeletal Surgeries/Procedures:: R shoulder surgery


Dermatological Surgical History: Reports: None





Social & Family History





- Family History


Family Medical History: No Pertinent Family History





- Tobacco Use


Tobacco Use Status *Q: Never Tobacco User





- Caffeine Use


Caffeine Use: Reports: Soda





- Recreational Drug Use


Recreational Drug Use: No





- Living Situation & Occupation


Living situation: Reports: , Alone (lives alone in apartment in [place], [place]. has 2 Daughters Kiana and Dona who live close by.)


Occupation: Retired





ED ROS GENERAL





- Review of Systems


Review Of Systems: See Below


Constitutional: Denies: Fever, Chills


Respiratory: Denies: Shortness of Breath


Cardiovascular: Denies: Chest Pain


GI/Abdominal: Reports: Abdominal Pain, Other (Denies constipation from 

medication).  Denies: Nausea, Vomiting


: Reports: Flank Pain (Some left flank discomfort, assuming from the fall)


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 

anterior abdomen she claims is unchanged)


Neurological: Denies: Headache, Paresthesia, Difficulty Walking





ED EXAM, GENERAL





- Physical Exam


Exam: See Below


Exam Limited By: No Limitations


General Appearance: Alert, No Apparent Distress


Head: Atraumatic


Neck: Non-Tender


Respiratory/Chest: No Respiratory Distress


GI/Abdominal: Soft, Tender (She does react with fairly significant tenderness to

 palpation in the left lower quadrant, even slight guarding but no rebound is 

found.  There is no bruising of the skin.  She has no tenderness of palpation of

 the lower ribs)


Neurological: Alert, Oriented


Psychiatric: Normal Affect, Normal Mood





Course





- Vital Signs


Last Recorded V/S: 


                                Last Vital Signs











Temp  98.2 F   21 04:02


 


Pulse  78   21 04:02


 


Resp  17   21 04:02


 


BP  146/68 H  21 04:02


 


Pulse Ox  94 L  21 04:02














- Re-Assessments/Exams


Free Text/Narrative Re-Assessment/Exam: 





21 04:20


A CT of the abdomen and pelvis was obtained to rule out diverticulitis or some 

other reason for her pain as I think it would be risky to just assume it was 

from the fall.


21 17:22


Patient remained relatively asymptomatic and comfortable while in the emergency 

room, CT showed no reason for abdominal pain.  Patient was reassured and will 

continue current treatment.  She can recheck with her primary care next week to 

consider physical therapy or further evaluation.





Departure





- Departure


Time of Disposition: 05:55


Disposition: Home, Self-Care 01


Clinical Impression: 


Abdominal wall contusion


Qualifiers:


 Encounter type: initial encounter Qualified Code(s): S30.1XXA - Contusion of 

abdominal wall, initial encounter








- Discharge Information


Instructions:  Contusion, Easy-to-Read


Referrals: 


PCP,None [Primary Care Provider] - 


Forms:  ED Department Discharge


Care Plan Goals: 


Use your pain medicine as directed, a heating pad may be helpful and increase 

activity as tolerated.  Consider rechecking with Dr. Pichardo if pain is 

persistent for another 1 to 2 weeks as you may need physical therapy or some 

other kind of treatment.





Sepsis Event Note (ED)





- Evaluation


Sepsis Screening Result: No Definite Risk

## 2021-04-17 ENCOUNTER — HOSPITAL ENCOUNTER (EMERGENCY)
Dept: HOSPITAL 11 - JP.ED | Age: 71
Discharge: HOME | End: 2021-04-17
Payer: MEDICARE

## 2021-04-17 VITALS — HEART RATE: 90 BPM | SYSTOLIC BLOOD PRESSURE: 174 MMHG | DIASTOLIC BLOOD PRESSURE: 91 MMHG

## 2021-04-17 DIAGNOSIS — E78.00: ICD-10-CM

## 2021-04-17 DIAGNOSIS — I10: ICD-10-CM

## 2021-04-17 DIAGNOSIS — Z88.6: ICD-10-CM

## 2021-04-17 DIAGNOSIS — J44.9: ICD-10-CM

## 2021-04-17 DIAGNOSIS — Z88.5: ICD-10-CM

## 2021-04-17 DIAGNOSIS — Z91.041: ICD-10-CM

## 2021-04-17 DIAGNOSIS — Z79.82: ICD-10-CM

## 2021-04-17 DIAGNOSIS — E66.9: ICD-10-CM

## 2021-04-17 DIAGNOSIS — Z91.048: ICD-10-CM

## 2021-04-17 DIAGNOSIS — M25.552: Primary | ICD-10-CM

## 2021-04-17 DIAGNOSIS — Z79.899: ICD-10-CM

## 2021-04-17 DIAGNOSIS — K21.9: ICD-10-CM

## 2021-04-17 DIAGNOSIS — M19.90: ICD-10-CM

## 2021-04-17 DIAGNOSIS — Z88.4: ICD-10-CM

## 2021-04-17 DIAGNOSIS — E11.9: ICD-10-CM

## 2021-04-17 DIAGNOSIS — Z88.8: ICD-10-CM

## 2021-04-17 DIAGNOSIS — Z20.822: ICD-10-CM

## 2021-04-17 DIAGNOSIS — Z79.84: ICD-10-CM

## 2021-04-17 LAB — SARS-COV-2 RNA RESP QL NAA+PROBE: NEGATIVE

## 2021-04-17 PROCEDURE — 0241U: CPT

## 2021-04-17 PROCEDURE — 71046 X-RAY EXAM CHEST 2 VIEWS: CPT

## 2021-04-17 PROCEDURE — 36415 COLL VENOUS BLD VENIPUNCTURE: CPT

## 2021-04-17 PROCEDURE — 73502 X-RAY EXAM HIP UNI 2-3 VIEWS: CPT

## 2021-04-17 PROCEDURE — 99283 EMERGENCY DEPT VISIT LOW MDM: CPT

## 2021-04-17 PROCEDURE — 85025 COMPLETE CBC W/AUTO DIFF WBC: CPT

## 2021-04-17 PROCEDURE — 80048 BASIC METABOLIC PNL TOTAL CA: CPT

## 2021-04-17 NOTE — EDM.PDOC
ED HPI GENERAL MEDICAL PROBLEM





- General


Chief Complaint: Abdominal Pain


Stated Complaint: PAIN IN CHEST


Time Seen by Provider: 21 15:53


Source of Information: Reports: Patient, RN Notes Reviewed


History Limitations: Reports: No Limitations





- History of Present Illness


INITIAL COMMENTS - FREE TEXT/NARRATIVE: 





70-year-old female presents emergency department with a complaint of cough and 

left hip pain, she states spray the cough for last couple of days no fevers no 

shortness of breath hip pain is been going on for about 3 weeks it seems to be 

progressively getting worse she denies any trauma





- Related Data


                                    Allergies











Allergy/AdvReac Type Severity Reaction Status Date / Time


 


codeine Allergy Intermediate Rash Verified 21 15:43


 


iodine Allergy Intermediate Rash Verified 21 15:43


 


acetaminophen [From [place]] Allergy  Confusion Verified 21 15:44


 


hydrocodone [From [place]] Allergy  Confusion Verified 21 15:44


 


Iodinated Contrast Media Allergy  Hives Verified 21 15:43





[Iodinated Contrast Media -     





IV Dye]     


 


isosorbide [Isosorbide] AdvReac  Cough Verified 21 15:43


 


midazolam HCl [From Versed] AdvReac  Confusion Verified 21 15:43


 


NSAIDS (Non-Steroidal AdvReac  Renal Verified 21 15:43





Anti-Inflamma   Failure  











Home Meds: 


                                    Home Meds





Albuterol Sulfate [Proair Hfa] 2 puff IH QID PRN 10/26/13 [History]


Aspirin [Adult Low Dose Aspirin EC] 81 mg PO DAILY 10/26/13 [History]


Cholecalciferol (Vitamin D3) [Vitamin D3] 1,000 units PO BID 10/26/13 [History]


Loratadine [Allergy] 10 mg PO ACBRK PRN 10/26/13 [History]


Montelukast [Singulair] 10 mg PO BEDTIME 10/26/13 [History]


metFORMIN [Glucophage] 1,000 mg PO PCBREAKFAST 10/26/13 [History]


polyethylene glycoL 3350 [MiraLAX] 17 gm PO DAILY PRN 10/26/13 [History]


traZODone 50 mg PO BEDTIME 10/26/13 [History]


Verapamil [Calan] 20 mg PO TID 04/10/14 [History]


Oxybutynin Chloride 5 mg PO TID 02/18/15 [History]


Gabapentin [Neurontin] 600 mg PO BEDTIME 08/05/15 [History]


Pantoprazole [ProTONIX***] 40 mg PO BID 10/23/15 [History]


Acetaminophen [Tylenol Arthritis] 650 mg PO Q8H PRN 11/10/16 [History]


Triamcinolone Acetonide [Triamcinolone Acetonide 0.1% Crm] 1 applic TOP TID 

18 [History]


atorvaSTATin [Lipitor] 10 mg PO BEDTIME 07/15/19 [History]


buPROPion HCL [Wellbutrin SR] 150 mg PO BID 07/15/19 [History]


Lactobacillus Rhamnosus GG [Culturelle] 1 cap PO BID #60 cap 19 [Rx]











Past Medical History


HEENT History: Reports: Cataract, Hard of Hearing, Impaired Vision, Macular 

Degeneration


Other HEENT History: wears glasses


Cardiovascular History: Reports: Arrhythmia, High Cholesterol, Hypertension, SOB

 on Exertion


Respiratory History: Reports: Asthma, COPD, SOB


Gastrointestinal History: Reports: GERD, Hiatal Hernia


Other Gastrointestinal History: Barretts esoph


Genitourinary History: Reports: Renal Disease, Urinary Incontinence


Other Genitourinary History: stage 3 kidney


OB/GYN History: Reports: Pregnancy


Musculoskeletal History: Reports: Arthritis, Fracture, Neck Pain, Chronic, 

Osteoarthritis


Neurological History: Reports: Headaches, Chronic


Other Neuro History: blood clot in brain (2016) resolved on it's own


Psychiatric History: Reports: Anxiety, Mood Swings


Endocrine/Metabolic History: Reports: Diabetes, Type II, Obesity/BMI 30+, 

Vitamin D Deficiency


Other Endocrine/Metabolic History: BS this am 100


Hematologic History: Reports: Anemia, Blood Transfusion(s)


Dermatologic History: Reports: Eczema





- Infectious Disease History


Infectious Disease History: Reports: Chicken Pox





- Past Surgical History


Head Surgeries/Procedures: Reports: None


HEENT Surgical History: Reports: Cataract Surgery, Eye Surgery


Cardiovascular Surgical History: Reports: Other (See Below)


Other Cardiovascular Surgeries/Procedures: cardioverson x2


Respiratory Surgical History: Reports: None


GI Surgical History: Reports: Colonoscopy, EGD, Esophageal Dilatation, Hernia 

Repair/Other, Nissen Fundoplication


Other GI Surgeries/Procedures: esophageal ablation.  lap nissen


Female  Surgical History: Reports:  Section


Other Female  Surgeries/Procedures: hx of kidney failure-being followed by 

nephrology


Endocrine Surgical History: Reports: None


Neurological Surgical History: Reports: None


Musculoskeletal Surgical History: Reports: Shoulder Surgery, Other (See Below)


Other Musculoskeletal Surgeries/Procedures:: R shoulder surgery


Dermatological Surgical History: Reports: None





Social & Family History





- Family History


Family Medical History: No Pertinent Family History





- Tobacco Use


Tobacco Use Status *Q: Never Tobacco User


Second Hand Smoke Exposure: No





- Caffeine Use


Caffeine Use: Reports: Soda





- Recreational Drug Use


Recreational Drug Use: No





- Living Situation & Occupation


Living situation: Reports: , Alone (lives alone in apartment in [place], [place]. has 2 Daughters Kiana and Dona who live close by.)


Occupation: Retired





ED ROS GENERAL





- Review of Systems


Review Of Systems: See Below


Constitutional: Reports: No Symptoms


HEENT: Reports: No Symptoms


Respiratory: Reports: Cough.  Denies: Shortness of Breath


Cardiovascular: Reports: No Symptoms


GI/Abdominal: Reports: No Symptoms


: Reports: No Symptoms


Musculoskeletal: Reports: Joint Pain (Hip pain)





ED EXAM, GENERAL





- Physical Exam


Exam: See Below


Free Text/Narrative:: 





Examination of the left hip she is tender over the greater trochanter but there 

is no tenderness with internal and external rotation flexion and extension of 

the hip joint itself


Exam Limited By: No Limitations


General Appearance: Alert, WD/WN, No Apparent Distress


Respiratory/Chest: No Respiratory Distress, Lungs Clear, Normal Breath Sounds, 

No Accessory Muscle Use, Chest Non-Tender


Cardiovascular: Regular Rate, Rhythm, No Murmur


GI/Abdominal: Soft, Non-Tender





Course





- Vital Signs


Last Recorded V/S: 


                                Last Vital Signs











Temp  97.0 F   21 15:48


 


Pulse  90   21 15:48


 


Resp  16   21 15:48


 


BP  174/91 H  21 15:48


 


Pulse Ox  96   21 15:48














- Orders/Labs/Meds


Orders: 


                               Active Orders 24 hr











 Category Date Time Status


 


 Notify Provider Consults [RC] ASDIRECTED Care  21 16:58 Ordered


 


 Consult to Orthopedic Clinic [CONS] Routine Cons  21 16:58 Ordered


 


 Consult to Physician [CONS] Routine Cons  21 16:58 Ordered


 


 Chest 2V [CR] Stat Exams  21 15:56 Taken


 


 Hip Min 2V or 3V Lt [CR] Stat Exams  21 15:56 Taken


 


 Isolation [COMM] Stat Oth  21 15:58 Ordered











Labs: 


                                Laboratory Tests











  21 Range/Units





  16:10 16:11 16:11 


 


WBC   9.3   (4.5-11.0)  K/uL


 


RBC   4.20   (3.30-5.50)  M/uL


 


Hgb   12.3   (12.0-15.0)  g/dL


 


Hct   38.9   (36.0-48.0)  %


 


MCV   93   (80-98)  fL


 


MCH   29   (27-31)  pg


 


MCHC   32   (32-36)  %


 


Plt Count   399   (150-400)  K/uL


 


Neut % (Auto)   67 H   (36-66)  %


 


Lymph % (Auto)   15 L   (24-44)  %


 


Mono % (Auto)   15 H   (2-6)  %


 


Eos % (Auto)   2   (2-4)  %


 


Baso % (Auto)   1   (0-1)  %


 


Sodium    139 L  (140-148)  mmol/L


 


Potassium    4.7  (3.6-5.2)  mmol/L


 


Chloride    99 L  (100-108)  mmol/L


 


Carbon Dioxide    28  (21-32)  mmol/L


 


Anion Gap    16.7 H  (5.0-14.0)  mmol/L


 


BUN    12  (7-18)  mg/dL


 


Creatinine    1.1 H  (0.6-1.0)  mg/dL


 


Est Cr Clr Drug Dosing    34.18  mL/min


 


Estimated GFR (MDRD)    49 L  (>60)  


 


Glucose    107 H  ()  mg/dL


 


Calcium    9.1  (8.5-10.1)  mg/dL


 


Influenza Type A RNA  Negative    (NEGATIVE)  


 


RSV RNA (INAAT)  Negative    (NEGATIVE)  


 


Influenza Type B RNA  Negative    (NEGATIVE)  


 


SARS-CoV-2 RNA (JUSTIN)  Negative    (NEGATIVE)  











Meds: 


Medications














Discontinued Medications














Generic Name Dose Route Start Last Admin





  Trade Name Freq  PRN Reason Stop Dose Admin


 


Acetaminophen  650 mg  21 15:59  21 16:06





  Acetaminophen 325 Mg Tab  PO  21 16:00  650 mg





  NOW ONE   Administration














Departure





- Departure


Time of Disposition: 16:59


Disposition: Home, Self-Care 01


Condition: Fair


Clinical Impression: 


 Left hip pain








- Discharge Information


Instructions:  Hip Pain


Referrals: 


Reece Pichardo MD [Primary Care Provider] - 


Forms:  ED Department Discharge


Additional Instructions: 


Try the Ultram as needed for pain control, the orthopedics clinic will call you 

for an appointment time, call return to the emergency department worsening of 

symptoms





Sepsis Event Note (ED)





- Evaluation


Sepsis Screening Result: No Definite Risk





- Focused Exam


Vital Signs: 


                                   Vital Signs











  Temp Pulse Resp BP Pulse Ox


 


 21 15:48  97.0 F  90  16  174/91 H  96


 


 21 15:36  97.0 F  90  16  174/91 H  96














- My Orders


Last 24 Hours: 


My Active Orders





21 15:56


Chest 2V [CR] Stat 


Hip Min 2V or 3V Lt [CR] Stat 





21 15:58


Isolation [COMM] Stat 





21 16:58


Notify Provider Consults [RC] ASDIRECTED 


Consult to Orthopedic Clinic [CONS] Routine 


Consult to Physician [CONS] Routine 














- Assessment/Plan


Last 24 Hours: 


My Active Orders





21 15:56


Chest 2V [CR] Stat 


Hip Min 2V or 3V Lt [CR] Stat 





21 15:58


Isolation [COMM] Stat 





21 16:58


Notify Provider Consults [RC] ASDIRECTED 


Consult to Orthopedic Clinic [CONS] Routine 


Consult to Physician [CONS] Routine 











Plan: 





Assessment





Acuity = acute





Site and laterality = left hip pain





Etiology  = suspicious for osteoarthritis





Manifestations = none





Location of injury =  Home





Lab values = I do not appreciate any acute process in the x-rays official read 

radiologist pending





Plan


She received some relief from the Tylenol provided because of her kidney 

dysfunction elected to try Ultram 50 mg 1 tab p.o. every 8 hours as needed also 

consultation with orthopedics set up for next week

















 This note was dictated using dragon voice recognition software please call with

 any questions on syntax or grammar.

## 2021-04-19 NOTE — CR
CHEST: 2 view

 

CLINICAL HISTORY:Cough

 

COMPARISON:[facility]

 

FINDINGS:  There is less than optimal inspiration exaggerating lung markings.

The heart size, pulmonary vascularity and hilar structures are normal. No

infiltrate effusion or pneumothorax is seen. There are atherosclerotic changes

in the aorta. 

 

IMPRESSION: No acute cardiopulmonary process.

## 2021-04-19 NOTE — CR
Hip Min 2V or 3V Lt

 

CLINICAL HISTORY: Pain, chronic

 

FINDINGS: Joint space shows some very mild narrowing. Articular surfaces are

smooth. No fracture or osseous lesion seen

 

IMPRESSION: Mild joint space narrowing

## 2021-08-14 ENCOUNTER — HOSPITAL ENCOUNTER (EMERGENCY)
Dept: HOSPITAL 11 - JP.ED | Age: 71
Discharge: HOME | End: 2021-08-14
Payer: MEDICARE

## 2021-08-14 VITALS — HEART RATE: 87 BPM | DIASTOLIC BLOOD PRESSURE: 71 MMHG | SYSTOLIC BLOOD PRESSURE: 147 MMHG

## 2021-08-14 DIAGNOSIS — E11.22: ICD-10-CM

## 2021-08-14 DIAGNOSIS — Z79.84: ICD-10-CM

## 2021-08-14 DIAGNOSIS — K21.9: ICD-10-CM

## 2021-08-14 DIAGNOSIS — J44.9: ICD-10-CM

## 2021-08-14 DIAGNOSIS — Z79.899: ICD-10-CM
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
## 2021-08-14 DIAGNOSIS — Z91.041: ICD-10-CM

## 2021-08-14 DIAGNOSIS — I12.9: ICD-10-CM

## 2021-08-14 DIAGNOSIS — Z79.82: ICD-10-CM

## 2021-08-14 DIAGNOSIS — S43.005A: Primary | ICD-10-CM

## 2021-08-14 DIAGNOSIS — Z88.8: ICD-10-CM

## 2021-08-14 DIAGNOSIS — E78.00: ICD-10-CM

## 2021-08-14 DIAGNOSIS — N18.30: ICD-10-CM

## 2021-08-14 DIAGNOSIS — Z88.5: ICD-10-CM

## 2021-08-14 DIAGNOSIS — E66.9: ICD-10-CM

## 2021-08-14 PROCEDURE — 23650 CLTX SHO DSLC W/MNPJ WO ANES: CPT

## 2021-08-14 PROCEDURE — 73060 X-RAY EXAM OF HUMERUS: CPT

## 2021-08-14 PROCEDURE — 99283 EMERGENCY DEPT VISIT LOW MDM: CPT

## 2021-08-14 PROCEDURE — 73020 X-RAY EXAM OF SHOULDER: CPT

## 2021-08-14 PROCEDURE — 99152 MOD SED SAME PHYS/QHP 5/>YRS: CPT

## 2021-08-14 RX ADMIN — PROPOFOL ONE MG: 10 INJECTION, EMULSION INTRAVENOUS at 18:38

## 2021-08-14 RX ADMIN — PROPOFOL ONE MG: 10 INJECTION, EMULSION INTRAVENOUS at 18:35

## 2021-08-14 NOTE — EDM.PDOC
ED HPI GENERAL MEDICAL PROBLEM





- General


Chief Complaint: Upper Extremity Injury/Pain


Stated Complaint: FELL AND HURT RIGHT ARM


Time Seen by Provider: 21 17:35


Source of Information: Reports: Patient


History Limitations: Reports: No Limitations





- History of Present Illness


INITIAL COMMENTS - FREE TEXT/NARRATIVE: 





70-year-old female, lives independently stumbled and fell using her walker.  She

fell onto her left arm.  She now has pain in the upper arm near the shoulder, 

and is unable to move the arm.  She also has some mild to moderate pain around 

the elbow with a superficial abrasion.  No other injury.


Onset: Sudden


Duration: Hour(s): (About 1 hour ago)


Location: Reports: Upper Extremity, Left


Quality: Reports: Sharp, Stabbing


Improves with: Reports: Rest


Worsens with: Reports: Movement


Associated Symptoms: Reports: No Other Symptoms.  Denies: Chest Pain, Cough, 

Shortness of Breath


  ** left arm


Pain Score (Numeric/FACES): 10





- Related Data


                                    Allergies











Allergy/AdvReac Type Severity Reaction Status Date / Time


 


codeine Allergy Intermediate Rash Verified 21 15:43


 


iodine Allergy Intermediate Rash Verified 21 15:43


 


acetaminophen [From [place]] Allergy  Confusion Verified 21 15:44


 


hydrocodone [From [place]] Allergy  Confusion Verified 21 15:44


 


Iodinated Contrast Media Allergy  Hives Verified 21 15:43





[Iodinated Contrast Media -     





IV Dye]     


 


isosorbide [Isosorbide] AdvReac  Cough Verified 21 15:43


 


midazolam HCl [From Versed] AdvReac  Confusion Verified 21 15:43


 


NSAIDS (Non-Steroidal AdvReac  Renal Verified 21 15:43





Anti-Inflamma   Failure  











Home Meds: 


                                    Home Meds





Albuterol Sulfate [Proair Hfa] 2 puff IH QID PRN 10/26/13 [History]


Aspirin [Adult Low Dose Aspirin EC] 81 mg PO DAILY 10/26/13 [History]


Cholecalciferol (Vitamin D3) [Vitamin D3] 1,000 units PO BID 10/26/13 [History]


Loratadine [Allergy] 10 mg PO ACBRK PRN 10/26/13 [History]


Montelukast [Singulair] 10 mg PO BEDTIME 10/26/13 [History]


metFORMIN [Glucophage] 1,000 mg PO PCBREAKFAST 10/26/13 [History]


polyethylene glycoL 3350 [MiraLAX] 17 gm PO DAILY PRN 10/26/13 [History]


traZODone 50 mg PO BEDTIME 10/26/13 [History]


Verapamil [Calan] 20 mg PO TID 04/10/14 [History]


Gabapentin [Neurontin] 600 mg PO BEDTIME 08/05/15 [History]


Pantoprazole [ProTONIX***] 40 mg PO BID 10/23/15 [History]


Acetaminophen [Tylenol Arthritis] 650 mg PO Q8H PRN 11/10/16 [History]


Triamcinolone Acetonide [Triamcinolone Acetonide 0.1% Crm] 1 applic TOP TID 

18 [History]


atorvaSTATin [Lipitor] 10 mg PO BEDTIME 07/15/19 [History]


buPROPion HCL [Wellbutrin SR] 150 mg PO BID 07/15/19 [History]


Lactobacillus Rhamnosus GG [Culturelle] 1 cap PO BID #60 cap 19 [Rx]


Albuterol/Ipratropium [DuoNeb 3.0-0.5 MG/3 ML] 3 ml INH Q4H PRN 21 

[History]


Citalopram Hydrobromide [Celexa] 40 mg PO DAILY 21 [History]


Tolterodine Tartrate [Detrol LA] 4 mg PO DAILY 21 [History]


Urea [Urea 40% Crm] 1 applic TOP BID 21 [History]


traMADol [Ultram] 50 mg PO Q8H PRN 21 [History]











Past Medical History


HEENT History: Reports: Cataract, Hard of Hearing, Impaired Vision, Macular 

Degeneration


Other HEENT History: wears glasses


Cardiovascular History: Reports: Arrhythmia, High Cholesterol, Hypertension, SOB

 on Exertion


Respiratory History: Reports: Asthma, COPD, SOB


Gastrointestinal History: Reports: GERD, Hiatal Hernia


Other Gastrointestinal History: Barretts esoph


Genitourinary History: Reports: Renal Disease, Urinary Incontinence


Other Genitourinary History: stage 3 kidney


OB/GYN History: Reports: Pregnancy


Musculoskeletal History: Reports: Arthritis, Fracture, Neck Pain, Chronic, 

Osteoarthritis


Other Musculoskeletal History: left hip pain


Neurological History: Reports: Headaches, Chronic


Other Neuro History: blood clot in brain (2016) resolved on it's own


Psychiatric History: Reports: Anxiety, Mood Swings


Endocrine/Metabolic History: Reports: Diabetes, Type II, Obesity/BMI 30+, 

Vitamin D Deficiency


Other Endocrine/Metabolic History: BS this am 100


Hematologic History: Reports: Anemia, Blood Transfusion(s)


Immunologic History: Reports: None


Oncologic (Cancer) History: Reports: None


Dermatologic History: Reports: Eczema





- Infectious Disease History


Infectious Disease History: Reports: Chicken Pox





- Past Surgical History


Head Surgeries/Procedures: Reports: None


HEENT Surgical History: Reports: Cataract Surgery, Eye Surgery


Cardiovascular Surgical History: Reports: Other (See Below)


Other Cardiovascular Surgeries/Procedures: cardioverson x2


Respiratory Surgical History: Reports: None


GI Surgical History: Reports: Colonoscopy, EGD, Esophageal Dilatation, Hernia 

Repair/Other, Nissen Fundoplication


Other GI Surgeries/Procedures: esophageal ablation.  lap nissen


Female  Surgical History: Reports:  Section


Other Female  Surgeries/Procedures: hx of kidney failure-being followed by 

nephrology


Endocrine Surgical History: Reports: None


Neurological Surgical History: Reports: None


Musculoskeletal Surgical History: Reports: Shoulder Surgery, Other (See Below)


Other Musculoskeletal Surgeries/Procedures:: R shoulder surgery


Dermatological Surgical History: Reports: None





Social & Family History





- Family History


Family Medical History: No Pertinent Family History





- Tobacco Use


Tobacco Use Status *Q: Never Tobacco User





- Caffeine Use


Caffeine Use: Reports: Soda





- Recreational Drug Use


Recreational Drug Use: No





- Living Situation & Occupation


Living situation: Reports: , Alone (lives alone in apartment in [place], [place]. has 2 Daughters Kiana and Dona who live close by.)


Occupation: Retired





Review of Systems





- Review of Systems


Review Of Systems: See Below


Constitutional: Denies: Fever


Respiratory: Reports: No Symptoms.  Denies: Shortness of Breath


Cardiovascular: Denies: Chest Pain


GI/Abdominal: Reports: No Symptoms


Genitourinary: Reports: No Symptoms


Musculoskeletal: Reports: Other (See HPI)


Skin: Reports: Other (Superficial abrasion on the left elbow, no bruising or 

erythema)


Neurological: Reports: No Symptoms


Psychiatric: Reports: No Symptoms





ED EXAM, GENERAL





- Physical Exam


Exam: See Below


Exam Limited By: No Limitations


General Appearance: Alert, No Apparent Distress (While sitting still, she is in 

no distress but looks uncomfortable)


Head: Atraumatic


Neck: Supple, Non-Tender


Respiratory/Chest: Lungs Clear


Cardiovascular: Regular Rate, Rhythm


GI/Abdominal: Soft, Non-Tender


Extremities: Other (Clavicle is nontender until palpating the very lateral 

aspect and proximal humerus causes discomfort.  No significant pain around the 

elbow or wrist.)


Psychiatric: Normal Affect, Normal Mood


Skin Exam: Other (Very superficial abrasion on the underside of the left elbow)





Course





- Vital Signs


Last Recorded V/S: 


                                Last Vital Signs











Temp  98.4 F   21 17:20


 


Pulse  87   21 17:20


 


Resp  18   21 17:20


 


BP  147/71 H  21 17:20


 


Pulse Ox  96   21 17:20














- Orders/Labs/Meds


Orders: 


                               Active Orders 24 hr











 Category Date Time Status


 


 Humerus Lt [CR] Stat Exams  21 17:35 Taken


 


 Shoulder 1V Lt [CR] Stat Exams  21 18:29 Taken


 


 DME for Discharge [COMM] Stat Oth  21 18:32 Ordered











Meds: 


Medications














Discontinued Medications














Generic Name Dose Route Start Last Admin





  Trade Name Freq  PRN Reason Stop Dose Admin


 


Sodium Chloride  1,000 mls @ 1,000 mls/hr  21 18:00  21 18:35





  Normal Saline  IV   1,000 mls/hr





  ASDIRECTED MEGHAN   Administration


 


Propofol  200 mg  21 17:50  21 18:38





  Propofol 200 Mg/20 Ml Sdv  IVPUSH  21 17:51  40 mg





  ONETIME ONE   Administration














- Re-Assessments/Exams


Free Text/Narrative Re-Assessment/Exam: 





21 17:39


A left humerus x-ray was obtained.


21 18:32


Humerus x-ray shows a anterior dislocation, no obvious fracture.  Patient was 

prepared for reduction of the dislocated shoulder under propofol sedation.  

Consent was obtained, and after 40 mg of IV propofol the patient was 

appropriately sedated.  Using countertraction the shoulder was reduced without 

complication, postreduction x-ray was obtained.


21 18:36


Post reduction x-ray looks good, patient was discharged with a sling that she sh

ould wear the next couple of days then gradually increase activity as tolerated.

  If she continues to have significant pain or is unable to use her left arm 

after 4 to 5 days, she should recheck with her primary provider or orthopedics.





Departure





- Departure


Time of Disposition: 19:13


Disposition: Home, Self-Care 01


Clinical Impression: 


Dislocation of left shoulder joint


Qualifiers:


 Encounter type: initial encounter Qualified Code(s): S43.005A - Unspecified 

dislocation of left shoulder joint, initial encounter








- Discharge Information


Instructions:  Shoulder Dislocation


Referrals: 


Reece Pichardo MD [Primary Care Provider] - 


Forms:  ED Department Discharge


Care Plan Goals: 


Use sling to rest arm for the next couple of days, and increase activity 

gradually and as tolerated.  Ibuprofen will be helpful with discomfort, and 

consider rechecking in 4 to 5 days if not improving satisfactorily or unable to 

use your arm without significant pain.  You can either recheck with your primary

provider or call Dr. Jaramillo at the orthopedic clinic.





Sepsis Event Note (ED)





- Evaluation


Sepsis Screening Result: No Definite Risk





- My Orders


Last 24 Hours: 


My Active Orders





21 17:35


Humerus Lt [CR] Stat 





21 18:29


Shoulder 1V Lt [CR] Stat 





21 18:32


DME for Discharge [COMM] Stat 














- Assessment/Plan


Last 24 Hours: 


My Active Orders





21 17:35


Humerus Lt [CR] Stat 





21 18:29


Shoulder 1V Lt [CR] Stat 





21 18:32


DME for Discharge [COMM] Stat

## 2021-08-16 NOTE — CR
Humerus Lt

 

CLINICAL HISTORY: Fall, pain

 

FINDINGS: There is dislocation of the shoulder. There is some deformity of the

humeral head..  

 

IMPRESSION: Shoulder dislocation

 

Proximal humeral fracture not excluded

 

 

 

Humerus Lt

 

CLINICAL HISTORY: Post reduction

 

FINDINGS: There is been reduction of the previous shoulder dislocation.

Nondisplaced fracture the proximal humerus is not excluded. 2 view study

recommended when patient's condition allows

 

Impression: Postreduction shoulder dislocation

 

Proximal humeral fracture not excluded

## 2022-06-18 ENCOUNTER — HOSPITAL ENCOUNTER (EMERGENCY)
Dept: HOSPITAL 11 - JP.ED | Age: 72
Discharge: HOME | End: 2022-06-18
Payer: MEDICARE

## 2022-06-18 VITALS — DIASTOLIC BLOOD PRESSURE: 55 MMHG | HEART RATE: 80 BPM | SYSTOLIC BLOOD PRESSURE: 146 MMHG

## 2022-06-18 DIAGNOSIS — Z79.82: ICD-10-CM

## 2022-06-18 DIAGNOSIS — J44.9: ICD-10-CM

## 2022-06-18 DIAGNOSIS — I12.0: ICD-10-CM

## 2022-06-18 DIAGNOSIS — E78.00: ICD-10-CM

## 2022-06-18 DIAGNOSIS — Z88.5: ICD-10-CM

## 2022-06-18 DIAGNOSIS — I12.9: ICD-10-CM

## 2022-06-18 DIAGNOSIS — Z91.041: ICD-10-CM

## 2022-06-18 DIAGNOSIS — Z79.84: ICD-10-CM

## 2022-06-18 DIAGNOSIS — N18.5: ICD-10-CM

## 2022-06-18 DIAGNOSIS — Z79.899: ICD-10-CM

## 2022-06-18 DIAGNOSIS — E66.9: ICD-10-CM

## 2022-06-18 DIAGNOSIS — E11.22: ICD-10-CM

## 2022-06-18 DIAGNOSIS — L03.116: Primary | ICD-10-CM

## 2022-06-18 DIAGNOSIS — Z88.8: ICD-10-CM

## 2022-06-18 PROCEDURE — 36415 COLL VENOUS BLD VENIPUNCTURE: CPT

## 2022-06-18 PROCEDURE — 85025 COMPLETE CBC W/AUTO DIFF WBC: CPT

## 2022-06-18 PROCEDURE — 99284 EMERGENCY DEPT VISIT MOD MDM: CPT

## 2022-06-18 PROCEDURE — 93971 EXTREMITY STUDY: CPT

## 2022-06-18 PROCEDURE — 80048 BASIC METABOLIC PNL TOTAL CA: CPT

## 2022-06-18 PROCEDURE — 86140 C-REACTIVE PROTEIN: CPT

## 2022-06-23 ENCOUNTER — HOSPITAL ENCOUNTER (EMERGENCY)
Dept: HOSPITAL 11 - JP.ED | Age: 72
Discharge: HOME | End: 2022-06-23
Payer: MEDICARE

## 2022-06-23 VITALS — DIASTOLIC BLOOD PRESSURE: 51 MMHG | SYSTOLIC BLOOD PRESSURE: 129 MMHG | HEART RATE: 71 BPM

## 2022-06-23 DIAGNOSIS — E66.9: ICD-10-CM

## 2022-06-23 DIAGNOSIS — Z79.82: ICD-10-CM

## 2022-06-23 DIAGNOSIS — Z79.899: ICD-10-CM

## 2022-06-23 DIAGNOSIS — E78.00: ICD-10-CM

## 2022-06-23 DIAGNOSIS — N18.31: ICD-10-CM

## 2022-06-23 DIAGNOSIS — Z79.84: ICD-10-CM

## 2022-06-23 DIAGNOSIS — E11.22: ICD-10-CM

## 2022-06-23 DIAGNOSIS — F41.9: ICD-10-CM

## 2022-06-23 DIAGNOSIS — Z88.8: ICD-10-CM

## 2022-06-23 DIAGNOSIS — R00.2: Primary | ICD-10-CM

## 2022-06-23 DIAGNOSIS — J44.9: ICD-10-CM

## 2022-06-23 DIAGNOSIS — D63.1: ICD-10-CM

## 2022-06-23 DIAGNOSIS — Z91.041: ICD-10-CM

## 2022-06-23 DIAGNOSIS — K21.9: ICD-10-CM

## 2022-06-23 DIAGNOSIS — I12.9: ICD-10-CM

## 2022-06-23 LAB — TROPONIN I SERPL HS-MCNC: 9.5 PG/ML (ref ?–60.3)

## 2022-06-27 ENCOUNTER — HOSPITAL ENCOUNTER (EMERGENCY)
Dept: HOSPITAL 11 - JP.ED | Age: 72
LOS: 1 days | Discharge: HOME | End: 2022-06-28
Payer: MEDICARE

## 2022-06-27 VITALS — DIASTOLIC BLOOD PRESSURE: 66 MMHG | SYSTOLIC BLOOD PRESSURE: 169 MMHG | HEART RATE: 85 BPM

## 2022-06-27 DIAGNOSIS — I10: ICD-10-CM

## 2022-06-27 DIAGNOSIS — R12: ICD-10-CM

## 2022-06-27 DIAGNOSIS — R55: Primary | ICD-10-CM

## 2022-06-27 DIAGNOSIS — Z88.4: ICD-10-CM

## 2022-06-27 DIAGNOSIS — F41.9: ICD-10-CM

## 2022-06-27 DIAGNOSIS — D64.9: ICD-10-CM

## 2022-06-27 DIAGNOSIS — E66.9: ICD-10-CM

## 2022-06-27 DIAGNOSIS — K21.9: ICD-10-CM

## 2022-06-27 DIAGNOSIS — Z79.82: ICD-10-CM

## 2022-06-27 DIAGNOSIS — J44.9: ICD-10-CM

## 2022-06-27 DIAGNOSIS — E78.00: ICD-10-CM

## 2022-06-27 DIAGNOSIS — Z79.899: ICD-10-CM

## 2022-06-27 DIAGNOSIS — Z91.041: ICD-10-CM

## 2022-06-27 DIAGNOSIS — E11.9: ICD-10-CM

## 2022-06-27 DIAGNOSIS — Z79.84: ICD-10-CM

## 2022-06-27 DIAGNOSIS — Z88.5: ICD-10-CM

## 2022-06-27 PROCEDURE — 99283 EMERGENCY DEPT VISIT LOW MDM: CPT

## 2022-06-27 PROCEDURE — 80048 BASIC METABOLIC PNL TOTAL CA: CPT

## 2022-06-27 PROCEDURE — 93005 ELECTROCARDIOGRAM TRACING: CPT

## 2022-06-27 PROCEDURE — 85025 COMPLETE CBC W/AUTO DIFF WBC: CPT

## 2022-06-27 PROCEDURE — 93010 ELECTROCARDIOGRAM REPORT: CPT

## 2022-06-27 PROCEDURE — 71045 X-RAY EXAM CHEST 1 VIEW: CPT

## 2022-06-27 PROCEDURE — 84484 ASSAY OF TROPONIN QUANT: CPT

## 2022-06-27 PROCEDURE — 99285 EMERGENCY DEPT VISIT HI MDM: CPT

## 2022-06-27 PROCEDURE — 83735 ASSAY OF MAGNESIUM: CPT

## 2022-06-27 PROCEDURE — 36415 COLL VENOUS BLD VENIPUNCTURE: CPT

## 2022-06-27 PROCEDURE — 81001 URINALYSIS AUTO W/SCOPE: CPT

## 2022-06-28 LAB — TROPONIN I SERPL HS-MCNC: 9.7 PG/ML (ref ?–60.3)

## 2022-11-25 ENCOUNTER — HOSPITAL ENCOUNTER (OUTPATIENT)
Dept: HOSPITAL 11 - JP.SDS | Age: 72
Discharge: HOME | End: 2022-11-25
Attending: SURGERY
Payer: MEDICARE

## 2022-11-25 VITALS — SYSTOLIC BLOOD PRESSURE: 123 MMHG | DIASTOLIC BLOOD PRESSURE: 50 MMHG | HEART RATE: 70 BPM

## 2022-11-25 DIAGNOSIS — Z88.5: ICD-10-CM

## 2022-11-25 DIAGNOSIS — Z79.899: ICD-10-CM

## 2022-11-25 DIAGNOSIS — R51.9: Primary | ICD-10-CM

## 2022-11-25 DIAGNOSIS — E78.5: ICD-10-CM

## 2022-11-25 DIAGNOSIS — Z91.041: ICD-10-CM

## 2022-11-25 DIAGNOSIS — N18.30: ICD-10-CM

## 2022-11-25 DIAGNOSIS — I12.9: ICD-10-CM

## 2022-11-25 DIAGNOSIS — Z88.6: ICD-10-CM

## 2022-11-25 DIAGNOSIS — E11.22: ICD-10-CM

## 2022-11-25 DIAGNOSIS — J44.9: ICD-10-CM

## 2022-11-25 PROCEDURE — 74176 CT ABD & PELVIS W/O CONTRAST: CPT

## 2022-11-25 PROCEDURE — 37609 LIGATION/BX TEMPORAL ARTERY: CPT

## 2022-11-25 PROCEDURE — 88313 SPECIAL STAINS GROUP 2: CPT

## 2022-11-25 PROCEDURE — 88305 TISSUE EXAM BY PATHOLOGIST: CPT

## 2022-11-25 RX ADMIN — SODIUM CHLORIDE ONE ML: 0.9 INJECTION, SOLUTION INTRAVENOUS at 08:48

## 2022-11-25 RX ADMIN — SODIUM CHLORIDE ONE ML: 0.9 INJECTION, SOLUTION INTRAVENOUS at 07:44

## 2023-05-11 ENCOUNTER — HOSPITAL ENCOUNTER (EMERGENCY)
Dept: HOSPITAL 11 - JP.ED | Age: 73
LOS: 1 days | Discharge: HOME | End: 2023-05-12
Payer: MEDICARE

## 2023-05-11 VITALS — DIASTOLIC BLOOD PRESSURE: 47 MMHG | HEART RATE: 86 BPM | SYSTOLIC BLOOD PRESSURE: 158 MMHG

## 2023-05-11 DIAGNOSIS — L03.116: Primary | ICD-10-CM

## 2023-05-11 DIAGNOSIS — M25.572: ICD-10-CM

## 2023-05-11 DIAGNOSIS — Z79.899: ICD-10-CM

## 2023-05-11 DIAGNOSIS — I48.91: ICD-10-CM

## 2023-05-11 DIAGNOSIS — I12.9: ICD-10-CM

## 2023-05-11 DIAGNOSIS — I87.2: ICD-10-CM

## 2023-05-11 DIAGNOSIS — E78.00: ICD-10-CM

## 2023-05-11 DIAGNOSIS — R60.0: ICD-10-CM

## 2023-05-11 DIAGNOSIS — G89.29: ICD-10-CM

## 2023-05-11 DIAGNOSIS — Z88.8: ICD-10-CM

## 2023-05-11 DIAGNOSIS — Z88.5: ICD-10-CM

## 2023-05-11 DIAGNOSIS — N18.30: ICD-10-CM

## 2023-05-11 DIAGNOSIS — Z79.82: ICD-10-CM

## 2023-05-11 DIAGNOSIS — E11.22: ICD-10-CM

## 2023-05-11 DIAGNOSIS — E66.9: ICD-10-CM

## 2023-05-11 DIAGNOSIS — Z91.041: ICD-10-CM

## 2023-05-11 LAB
ALBUMIN SERPL-MCNC: 3 G/DL (ref 3.4–5)
ALBUMIN/GLOB SERPL: 0.6 {RATIO} (ref 1.2–2.2)
ALP SERPL-CCNC: 134 U/L (ref 46–116)
ALT SERPL-CCNC: 22 U/L (ref 12–78)
ANION GAP SERPL CALC-SCNC: 14.5 MMOL/L (ref 5–14)
AST SERPL-CCNC: 28 U/L (ref 15–37)
BASOPHILS # BLD AUTO: 0.14 K/UL (ref 0–0.1)
BASOPHILS NFR BLD AUTO: 1.6 % (ref 0.1–1.3)
BILIRUB SERPL-MCNC: 0.2 MG/DL (ref 0.2–1)
BUN SERPL-MCNC: 18 MG/DL (ref 7–18)
CALCIUM SERPL-MCNC: 8.3 MG/DL (ref 8.5–10.1)
CHLORIDE SERPL-SCNC: 97 MMOL/L (ref 100–108)
CO2 SERPL-SCNC: 28 MMOL/L (ref 21–32)
CREAT CL 24H UR+SERPL-VRATE: 30.44 ML/MIN
CREAT SERPL-MCNC: 1.2 MG/DL (ref 0.6–1)
CRP SERPL-MCNC: 1.38 MG/DL (ref 0–0.3)
EOSINOPHIL # BLD AUTO: 0.46 K/UL (ref 0–0.4)
EOSINOPHIL NFR BLD AUTO: 5.4 % (ref 0–5.4)
GLOBULIN SER-MCNC: 4.7 G/DL (ref 2.3–3.5)
GLUCOSE SERPL-MCNC: 196 MG/DL (ref 74–106)
HCT VFR BLD AUTO: 33.4 % (ref 34.3–46)
HGB BLD-MCNC: 10.8 G/DL (ref 11.2–15.5)
IMM GRANULOCYTES # BLD: 0.03 K/UL (ref 0–0.23)
IMM GRANULOCYTES NFR BLD: 0.3 % (ref 0–0.7)
LYMPHOCYTES # BLD AUTO: 1.69 K/UL (ref 0.8–3.3)
LYMPHOCYTES NFR BLD AUTO: 19.7 % (ref 11.4–47.7)
MCH RBC QN AUTO: 30.4 PG (ref 31.6–35.5)
MCHC RBC AUTO-ENTMCNC: 32.3 G/DL (ref 31.6–35.5)
MCHC RBC AUTO-ENTMCNC: 94.1 FL (ref 81.4–99)
MONOCYTES # BLD AUTO: 1.21 K/UL (ref 0.2–0.9)
MONOCYTES NFR BLD AUTO: 14.1 % (ref 3.3–12.6)
NEUTROPHILS # BLD AUTO: 5.06 K/UL (ref 1–7.6)
NEUTROPHILS NFR BLD AUTO: 58.9 % (ref 40–78.1)
PLATELET # BLD AUTO: 331 K/UL (ref 130–375)
POTASSIUM SERPL-SCNC: 4.5 MMOL/L (ref 3.6–5.2)
PROT SERPL-MCNC: 7.7 G/DL (ref 6.4–8.2)
RBC # BLD AUTO: 3.55 M/UL (ref 3.77–5.24)
SODIUM SERPL-SCNC: 135 MMOL/L (ref 140–148)
WBC # BLD AUTO: 8.6 K/UL (ref 3.2–11)

## 2023-05-11 PROCEDURE — 36415 COLL VENOUS BLD VENIPUNCTURE: CPT

## 2023-05-11 PROCEDURE — 80053 COMPREHEN METABOLIC PANEL: CPT

## 2023-05-11 PROCEDURE — 93971 EXTREMITY STUDY: CPT

## 2023-05-11 PROCEDURE — 85379 FIBRIN DEGRADATION QUANT: CPT

## 2023-05-11 PROCEDURE — 85025 COMPLETE CBC W/AUTO DIFF WBC: CPT

## 2023-05-11 PROCEDURE — 86140 C-REACTIVE PROTEIN: CPT

## 2023-05-11 PROCEDURE — 87040 BLOOD CULTURE FOR BACTERIA: CPT

## 2023-05-11 PROCEDURE — 99284 EMERGENCY DEPT VISIT MOD MDM: CPT

## 2024-04-11 NOTE — EDM.PDOC
[facility]  [address]   [address]  [facility] [address]  Dept: [phone]   Patient Name: Elva Katz  Patient : 1985  MRN #: [id number]  CSN #: [id number]    [place] [facility] Blood Pressure Nurse Visit    Visit date:  2024     Elva Katz is here for a follow up blood pressure nurse visit.    Elva Katz is ____ pregnant or ____ postpartum delivered on 3/16/24      Symptoms of pre-eclampsia       Headache: No       Blurry vision:  No       Edema: No       RUQ pain: No       SOB: No      Taking antihypertensives:  Yes       BP (!) 136/90   Wt 69.9 kg (154 lb)   LMP 2023   Breastfeeding Yes   BMI 28.63 kg/m²          If > 140/90 Karmen Yu CNM was notified prior to the patient leaving the office     Does the patient have a 2 week and 6 week postpartum follow up scheduled? Yes      Completed chart forwarded to Karmen Yu after completing visit.     Charted by Karen Perez MA     <Catia Ellis - Last Filed: 21 17:54>





ED HPI GENERAL MEDICAL PROBLEM





- General


Chief Complaint: General


Stated Complaint: FELL AND HIT HEAD


Time Seen by Provider: 21 17:07


Source of Information: Reports: Patient


History Limitations: Reports: No Limitations





- History of Present Illness


INITIAL COMMENTS - FREE TEXT/NARRATIVE: 





pt arrived with a history of suddenly feeling very weak. She was in her bedroom 

and she tried to stand and fell and hit her head on the rt forehead area. She 

does not think she was knocked out. She has a pass history of a subdural. She is

not on blood thinners, 


Onset: Today, Sudden


Duration: Hour(s):


Location: Reports: Head, Face, Generalized, Other (pt is very wobbly on her feet

and could not stand alone. )


Associated Symptoms: Reports: No Other Symptoms, Weakness, Other (pt did receive

her second covid shot yesterday. )





- Related Data


                                    Allergies











Allergy/AdvReac Type Severity Reaction Status Date / Time


 


codeine Allergy Intermediate Rash Verified 21 15:21


 


iodine Allergy Intermediate Rash Verified 21 15:21


 


Iodinated Contrast Media Allergy  Hives Verified 21 15:21





[Iodinated Contrast Media -     





IV Dye]     


 


isosorbide [Isosorbide] AdvReac  Cough Verified 21 15:21


 


midazolam HCl [From Versed] AdvReac  Confusion Verified 21 15:21


 


NSAIDS (Non-Steroidal AdvReac  Renal Verified 21 15:21





Anti-Inflamma   Failure  











Home Meds: 


                                    Home Meds





Albuterol Sulfate [Proair Hfa] 2 puff IH QID PRN 10/26/13 [History]


Aspirin [Adult Low Dose Aspirin EC] 81 mg PO DAILY 10/26/13 [History]


Cholecalciferol (Vitamin D3) [Vitamin D3] 1,000 units PO BID 10/26/13 [History]


Loratadine [Allergy] 10 mg PO ACBRK PRN 10/26/13 [History]


Montelukast [Singulair] 10 mg PO BEDTIME 10/26/13 [History]


metFORMIN [Glucophage] 1,000 mg PO PCBREAKFAST 10/26/13 [History]


polyethylene glycoL 3350 [MiraLAX] 17 gm PO DAILY PRN 10/26/13 [History]


traZODone 50 mg PO BEDTIME 10/26/13 [History]


Verapamil [Calan] 20 mg PO TID 04/10/14 [History]


Oxybutynin Chloride 5 mg PO TID 02/18/15 [History]


Gabapentin [Neurontin] 600 mg PO BEDTIME 08/05/15 [History]


Pantoprazole [ProTONIX***] 40 mg PO BID 10/23/15 [History]


Acetaminophen [Tylenol Arthritis] 650 mg PO Q8H PRN 11/10/16 [History]


Triamcinolone Acetonide [Triamcinolone Acetonide 0.1% Crm] 1 applic TOP TID 

18 [History]


atorvaSTATin [Lipitor] 10 mg PO BEDTIME 07/15/19 [History]


buPROPion HCL [Wellbutrin SR] 150 mg PO BID 07/15/19 [History]


Lactobacillus Rhamnosus GG [Culturelle] 1 cap PO BID #60 cap 19 [Rx]











Past Medical History


HEENT History: Reports: Cataract, Hard of Hearing, Impaired Vision, Macular 

Degeneration


Other HEENT History: wears glasses


Cardiovascular History: Reports: Arrhythmia, High Cholesterol, Hypertension, SOB

 on Exertion


Respiratory History: Reports: Asthma, COPD, SOB


Gastrointestinal History: Reports: GERD, Hiatal Hernia


Other Gastrointestinal History: Barretts esoph


Genitourinary History: Reports: Renal Disease, Urinary Incontinence


Other Genitourinary History: stage 3 kidney


OB/GYN History: Reports: Pregnancy


Musculoskeletal History: Reports: Arthritis, Fracture, Neck Pain, Chronic, 

Osteoarthritis


Neurological History: Reports: Headaches, Chronic


Other Neuro History: blood clot in brain (2016) resolved on it's own


Psychiatric History: Reports: Anxiety, Mood Swings


Endocrine/Metabolic History: Reports: Diabetes, Type II, Obesity/BMI 30+, Vitam

in D Deficiency


Other Endocrine/Metabolic History: BS this am 100


Hematologic History: Reports: Anemia, Blood Transfusion(s)


Dermatologic History: Reports: Eczema





- Infectious Disease History


Infectious Disease History: Reports: Chicken Pox





- Past Surgical History


Head Surgeries/Procedures: Reports: None


HEENT Surgical History: Reports: Cataract Surgery, Eye Surgery


Cardiovascular Surgical History: Reports: None


Other Cardiovascular Surgeries/Procedures: cardioverson x2


Respiratory Surgical History: Reports: None


GI Surgical History: Reports: Colonoscopy, EGD, Esophageal Dilatation, Hernia 

Repair/Other, Nissen Fundoplication


Other GI Surgeries/Procedures: esophageal ablation.  lap nissen


Female  Surgical History: Reports:  Section


Other Female  Surgeries/Procedures: hx of kidney failure-being followed by 

nephrology


Endocrine Surgical History: Reports: None


Neurological Surgical History: Reports: None


Musculoskeletal Surgical History: Reports: Shoulder Surgery, Other (See Below)


Other Musculoskeletal Surgeries/Procedures:: R shoulder surgery


Dermatological Surgical History: Reports: None





Social & Family History





- Family History


Family Medical History: No Pertinent Family History





- Tobacco Use


Tobacco Use Status *Q: Never Tobacco User





- Caffeine Use


Caffeine Use: Reports: Soda, Tea





- Recreational Drug Use


Recreational Drug Use: No





ED ROS GENERAL





- Review of Systems


Review Of Systems: See Below


Constitutional: Reports: Weakness, Other (pt was so weak that she did fall and 

hit her head and rt shoulder. )


HEENT: Reports: No Symptoms


Respiratory: Reports: No Symptoms


Cardiovascular: Reports: No Symptoms, Other (pt does not have chest pain)


Endocrine: Reports: No Symptoms


GI/Abdominal: Reports: No Symptoms


: Reports: No Symptoms


Musculoskeletal: Reports: No Symptoms, Other (pt has bruising of the ert 

shoulder but she is not having pain in the shoulder. )


Skin: Reports: Other (pt appears to bruise very easily. )


Neurological: Reports: Weakness, Other ( she does not describe dizziness as much

 as profound weakness. )


Psychiatric: Reports: Anxiety





ED EXAM, GENERAL





- Physical Exam


Exam: See Below


Free Text/Narrative:: 





pt became very weak in her room and she fell and hit her head and rt shoulder. 


Exam Limited By: No Limitations


General Appearance: Alert, Mild Distress, Other (pt has alot of bruising but she

 is not uncomfortable.  pupils are equal and reactive. )


Ears: Normal TMs


Nose: Normal Inspection


Throat/Mouth: Normal Inspection


Head: Other (pt has marked bruising of the rt side of the forehead. )


Neck: Normal Inspection


Respiratory/Chest: No Respiratory Distress


Cardiovascular: Regular Rate, Rhythm


GI/Abdominal: Soft, Non-Tender, Other (pt has a lump in the abdomanal wall which

 is from a previous hematoma from a fall. )


 (Female) Exam: Deferred


Rectal (Female) Exam: Deferred


Back Exam: Normal Inspection


Extremities: Normal Inspection


Neurological: Alert, Oriented, Confused


Psychiatric: Anxious





Departure





- Departure


Disposition: Admitted As Inpatient 66


Clinical Impression: 


 Generalized weakness, Dizziness





Fall from standing


Qualifiers:


 Encounter type: initial encounter Qualified Code(s): W19.XXXA - Unspecified 

fall, initial encounter








- Discharge Information


Referrals: 


Reece Pichardo MD [Primary Care Provider] - 


Forms:  ED Department Discharge





<Mahesh Rome - Last Filed: 21 18:38>





Course





- Vital Signs


Last Recorded V/S: 





                                Last Vital Signs











Temp  36.8 C   21 17:07


 


Pulse  86   21 17:58


 


Resp  16   21 17:58


 


BP  132/52 L  21 17:58


 


Pulse Ox  96   21 17:58














- Orders/Labs/Meds


Orders: 





                               Active Orders 24 hr











 Category Date Time Status


 


 Cardiac Monitoring [RC] .As Directed Care  21 17:19 Active


 


 EKG Documentation Completion [RC] ASDIRECTED Care  21 18:03 Active


 


 UA W/MICROSCOPIC [URIN] Urgent Lab  21 17:17 Ordered


 


 Sodium Chloride 0.9% [Normal Saline] 1,000 ml Med  21 17:30 Active





 IV ASDIRECTED   


 


 EKG 12 Lead [EK] Routine Ther  21 18:03 Ordered








                                Medication Orders





Sodium Chloride (Normal Saline)  1,000 mls @ 300 mls/hr IV ASDIRECTED MEGHAN


   Last Admin: 21 17:48  Dose: 300 mls/hr


   Documented by: JOSAFAT








Labs: 





                                Laboratory Tests











  21 Range/Units





  17:18 17:45 17:46 


 


WBC   15.3 H   (4.5-11.0)  K/uL


 


RBC   3.97   (3.30-5.50)  M/uL


 


Hgb   11.9 L   (12.0-15.0)  g/dL


 


Hct   36.8   (36.0-48.0)  %


 


MCV   93   (80-98)  fL


 


MCH   30   (27-31)  pg


 


MCHC   32   (32-36)  %


 


Plt Count   360   (150-400)  K/uL


 


Neut % (Auto)   85 H   (36-66)  %


 


Lymph % (Auto)   4 L   (24-44)  %


 


Mono % (Auto)   9 H   (2-6)  %


 


Eos % (Auto)   1 L   (2-4)  %


 


Baso % (Auto)   1   (0-1)  %


 


APTT     (27.0-36.0)  sec


 


Sodium    134 L  (140-148)  mmol/L


 


Potassium    4.8  (3.6-5.2)  mmol/L


 


Chloride    97 L  (100-108)  mmol/L


 


Carbon Dioxide    27  (21-32)  mmol/L


 


Anion Gap    14.8 H  (5.0-14.0)  mmol/L


 


BUN    18  (7-18)  mg/dL


 


Creatinine    1.2 H  (0.6-1.0)  mg/dL


 


Est Cr Clr Drug Dosing    31.33  mL/min


 


Estimated GFR (MDRD)    44 L  (>60)  


 


Glucose    109 H  ()  mg/dL


 


POC Glucose  111 H    ()  MG/DL


 


Calcium    9.0  D  (8.5-10.1)  mg/dL


 


Total Bilirubin    0.3  (0.2-1.0)  mg/dL


 


AST    34  D  (15-37)  U/L


 


ALT    34  (12-78)  U/L


 


Alkaline Phosphatase    106  ()  U/L


 


Total Protein    7.4  (6.4-8.2)  g/dL


 


Albumin    2.9 L  (3.4-5.0)  g/dL


 


Globulin    4.5 H  (2.3-3.5)  g/dL


 


Albumin/Globulin Ratio    0.6 L  (1.2-2.2)  














  / Range/Units





  17:46 


 


WBC   (4.5-11.0)  K/uL


 


RBC   (3.30-5.50)  M/uL


 


Hgb   (12.0-15.0)  g/dL


 


Hct   (36.0-48.0)  %


 


MCV   (80-98)  fL


 


MCH   (27-31)  pg


 


MCHC   (32-36)  %


 


Plt Count   (150-400)  K/uL


 


Neut % (Auto)   (36-66)  %


 


Lymph % (Auto)   (24-44)  %


 


Mono % (Auto)   (2-6)  %


 


Eos % (Auto)   (2-4)  %


 


Baso % (Auto)   (0-1)  %


 


APTT  28.0  (27.0-36.0)  sec


 


Sodium   (140-148)  mmol/L


 


Potassium   (3.6-5.2)  mmol/L


 


Chloride   (100-108)  mmol/L


 


Carbon Dioxide   (21-32)  mmol/L


 


Anion Gap   (5.0-14.0)  mmol/L


 


BUN   (7-18)  mg/dL


 


Creatinine   (0.6-1.0)  mg/dL


 


Est Cr Clr Drug Dosing   mL/min


 


Estimated GFR (MDRD)   (>60)  


 


Glucose   ()  mg/dL


 


POC Glucose   ()  MG/DL


 


Calcium   (8.5-10.1)  mg/dL


 


Total Bilirubin   (0.2-1.0)  mg/dL


 


AST   (15-37)  U/L


 


ALT   (12-78)  U/L


 


Alkaline Phosphatase   ()  U/L


 


Total Protein   (6.4-8.2)  g/dL


 


Albumin   (3.4-5.0)  g/dL


 


Globulin   (2.3-3.5)  g/dL


 


Albumin/Globulin Ratio   (1.2-2.2)  











Meds: 





Medications











Generic Name Dose Route Start Last Admin





  Trade Name Freq  PRN Reason Stop Dose Admin


 


Sodium Chloride  1,000 mls @ 300 mls/hr  21 17:30  21 17:48





  Normal Saline  IV   300 mls/hr





  ASDIRECTED [facility]   Administration














- Re-Assessments/Exams


Free Text/Narrative Re-Assessment/Exam: 





21 18:00 Dr. Rome is assuming care of the patient from Dr. Ellis.





21 18:20 I reviewed the CT of the head without contrast demonstrating a 

right forehead hematoma without evidence of a cranial fracture or intracranial 

abnormality.  There is no evidence for cranial bleeding.  There is no mass-effe

ct or midline shift.  Viewed the labs showing a leukocytosis of 15.3 with 85% 

neutrophils.  The comprehensive metabolic panel was significant for sodium of 

134 and a creatinine of 1.2.  At this time, it is clear that the patient is too 

unsteady to go home.  Whether this is directly due to receiving her second Covid

 vaccine or not is good to be difficult to determine, however, she does meet 

requirement for hospitalization at this time as she lives alone in an apartment.

  I discussed the case with Sabrina Souza CNP who will arrange the admission to

 the hospital.  Still awaiting the urine results, however, the patient has not 

voided yet but it would not be unreasonable to think that UTI could be causing 

her symptoms.  Regardless, the patient still would require hospitalization at 

this time.








Departure





- Departure


Time of Disposition: 18:36





Sepsis Event Note (ED)





- Focused Exam


Vital Signs: 





                                   Vital Signs











  Temp Pulse Resp BP Pulse Ox


 


 21 17:58   86  16  132/52 L  96


 


 21 17:07  36.8 C  89  16  142/56 H  94 L


 


 21 17:03  36.8 C  89  16  142/56 H  94 L

## 2025-05-12 ENCOUNTER — HOSPITAL ENCOUNTER (EMERGENCY)
Dept: HOSPITAL 11 - JP.ED | Age: 75
Discharge: NURSING, REHAB OR LONG-TERM CARE | End: 2025-05-12
Payer: MEDICARE

## 2025-05-12 VITALS — HEART RATE: 82 BPM | DIASTOLIC BLOOD PRESSURE: 59 MMHG | SYSTOLIC BLOOD PRESSURE: 152 MMHG

## 2025-05-12 DIAGNOSIS — E78.00: ICD-10-CM

## 2025-05-12 DIAGNOSIS — Z88.8: ICD-10-CM

## 2025-05-12 DIAGNOSIS — Z79.899: ICD-10-CM

## 2025-05-12 DIAGNOSIS — Z91.048: ICD-10-CM

## 2025-05-12 DIAGNOSIS — E66.9: ICD-10-CM

## 2025-05-12 DIAGNOSIS — Z79.84: ICD-10-CM

## 2025-05-12 DIAGNOSIS — Z88.5: ICD-10-CM

## 2025-05-12 DIAGNOSIS — Z88.2: ICD-10-CM

## 2025-05-12 DIAGNOSIS — J44.89: ICD-10-CM

## 2025-05-12 DIAGNOSIS — N18.9: ICD-10-CM

## 2025-05-12 DIAGNOSIS — Z79.82: ICD-10-CM

## 2025-05-12 DIAGNOSIS — I12.9: ICD-10-CM

## 2025-05-12 DIAGNOSIS — K22.9: Primary | ICD-10-CM

## 2025-05-12 DIAGNOSIS — E11.22: ICD-10-CM
